# Patient Record
Sex: MALE | Race: ASIAN | NOT HISPANIC OR LATINO | Employment: OTHER | URBAN - METROPOLITAN AREA
[De-identification: names, ages, dates, MRNs, and addresses within clinical notes are randomized per-mention and may not be internally consistent; named-entity substitution may affect disease eponyms.]

---

## 2019-08-21 ENCOUNTER — APPOINTMENT (OUTPATIENT)
Dept: RADIOLOGY | Facility: MEDICAL CENTER | Age: 64
DRG: 228 | End: 2019-08-21
Payer: COMMERCIAL

## 2019-08-21 ENCOUNTER — APPOINTMENT (OUTPATIENT)
Dept: CARDIOLOGY | Facility: MEDICAL CENTER | Age: 64
DRG: 228 | End: 2019-08-21
Attending: INTERNAL MEDICINE
Payer: COMMERCIAL

## 2019-08-21 ENCOUNTER — APPOINTMENT (OUTPATIENT)
Dept: RADIOLOGY | Facility: MEDICAL CENTER | Age: 64
DRG: 228 | End: 2019-08-21
Attending: INTERNAL MEDICINE
Payer: COMMERCIAL

## 2019-08-21 ENCOUNTER — APPOINTMENT (OUTPATIENT)
Dept: CARDIOLOGY | Facility: MEDICAL CENTER | Age: 64
DRG: 228 | End: 2019-08-21
Payer: COMMERCIAL

## 2019-08-21 ENCOUNTER — HOSPITAL ENCOUNTER (INPATIENT)
Facility: MEDICAL CENTER | Age: 64
LOS: 2 days | DRG: 228 | End: 2019-08-23
Attending: INTERNAL MEDICINE | Admitting: INTERNAL MEDICINE
Payer: COMMERCIAL

## 2019-08-21 DIAGNOSIS — I21.3 ST ELEVATION MYOCARDIAL INFARCTION (STEMI), UNSPECIFIED ARTERY (HCC): ICD-10-CM

## 2019-08-21 PROBLEM — Z72.0 NICOTINE ABUSE: Status: ACTIVE | Noted: 2019-08-21

## 2019-08-21 PROBLEM — R73.9 HYPERGLYCEMIA: Status: ACTIVE | Noted: 2019-08-21

## 2019-08-21 PROBLEM — I50.21 ACUTE SYSTOLIC HEART FAILURE (HCC): Status: ACTIVE | Noted: 2019-08-21

## 2019-08-21 PROBLEM — I10 ESSENTIAL HYPERTENSION: Status: ACTIVE | Noted: 2019-08-21

## 2019-08-21 LAB
ALBUMIN SERPL BCP-MCNC: 3.9 G/DL (ref 3.2–4.9)
ALBUMIN/GLOB SERPL: 1.3 G/DL
ALP SERPL-CCNC: 69 U/L (ref 30–99)
ALT SERPL-CCNC: 14 U/L (ref 2–50)
AMPHET UR QL SCN: NEGATIVE
ANION GAP SERPL CALC-SCNC: 11 MMOL/L (ref 0–11.9)
APTT PPP: 29.9 SEC (ref 24.7–36)
AST SERPL-CCNC: 15 U/L (ref 12–45)
BARBITURATES UR QL SCN: NEGATIVE
BASOPHILS # BLD AUTO: 0.5 % (ref 0–1.8)
BASOPHILS # BLD: 0.04 K/UL (ref 0–0.12)
BENZODIAZ UR QL SCN: POSITIVE
BILIRUB SERPL-MCNC: 0.3 MG/DL (ref 0.1–1.5)
BUN SERPL-MCNC: 21 MG/DL (ref 8–22)
BZE UR QL SCN: NEGATIVE
CALCIUM SERPL-MCNC: 9.4 MG/DL (ref 8.5–10.5)
CANNABINOIDS UR QL SCN: NEGATIVE
CHLORIDE SERPL-SCNC: 105 MMOL/L (ref 96–112)
CO2 SERPL-SCNC: 23 MMOL/L (ref 20–33)
CREAT SERPL-MCNC: 0.98 MG/DL (ref 0.5–1.4)
EKG IMPRESSION: NORMAL
EOSINOPHIL # BLD AUTO: 0.18 K/UL (ref 0–0.51)
EOSINOPHIL NFR BLD: 2.4 % (ref 0–6.9)
ERYTHROCYTE [DISTWIDTH] IN BLOOD BY AUTOMATED COUNT: 47.7 FL (ref 35.9–50)
GLOBULIN SER CALC-MCNC: 2.9 G/DL (ref 1.9–3.5)
GLUCOSE SERPL-MCNC: 112 MG/DL (ref 65–99)
HCT VFR BLD AUTO: 43.3 % (ref 42–52)
HGB BLD-MCNC: 14.2 G/DL (ref 14–18)
IMM GRANULOCYTES # BLD AUTO: 0.02 K/UL (ref 0–0.11)
IMM GRANULOCYTES NFR BLD AUTO: 0.3 % (ref 0–0.9)
INR PPP: 0.95 (ref 0.87–1.13)
LIPASE SERPL-CCNC: 4 U/L (ref 11–82)
LYMPHOCYTES # BLD AUTO: 2.03 K/UL (ref 1–4.8)
LYMPHOCYTES NFR BLD: 26.7 % (ref 22–41)
MCH RBC QN AUTO: 31.7 PG (ref 27–33)
MCHC RBC AUTO-ENTMCNC: 32.8 G/DL (ref 33.7–35.3)
MCV RBC AUTO: 96.7 FL (ref 81.4–97.8)
METHADONE UR QL SCN: NEGATIVE
MONOCYTES # BLD AUTO: 0.76 K/UL (ref 0–0.85)
MONOCYTES NFR BLD AUTO: 10 % (ref 0–13.4)
NEUTROPHILS # BLD AUTO: 4.57 K/UL (ref 1.82–7.42)
NEUTROPHILS NFR BLD: 60.1 % (ref 44–72)
NRBC # BLD AUTO: 0 K/UL
NRBC BLD-RTO: 0 /100 WBC
NT-PROBNP SERPL IA-MCNC: 211 PG/ML (ref 0–125)
OPIATES UR QL SCN: POSITIVE
OXYCODONE UR QL SCN: NEGATIVE
PCP UR QL SCN: NEGATIVE
PLATELET # BLD AUTO: 202 K/UL (ref 164–446)
PMV BLD AUTO: 9.7 FL (ref 9–12.9)
POTASSIUM SERPL-SCNC: 4.1 MMOL/L (ref 3.6–5.5)
PROPOXYPH UR QL SCN: NEGATIVE
PROT SERPL-MCNC: 6.8 G/DL (ref 6–8.2)
PROTHROMBIN TIME: 12.9 SEC (ref 12–14.6)
RBC # BLD AUTO: 4.48 M/UL (ref 4.7–6.1)
SODIUM SERPL-SCNC: 139 MMOL/L (ref 135–145)
TROPONIN T SERPL-MCNC: 2449 NG/L (ref 6–19)
TROPONIN T SERPL-MCNC: 33 NG/L (ref 6–19)
TROPONIN T SERPL-MCNC: 3663 NG/L (ref 6–19)
WBC # BLD AUTO: 7.6 K/UL (ref 4.8–10.8)

## 2019-08-21 PROCEDURE — 700105 HCHG RX REV CODE 258

## 2019-08-21 PROCEDURE — 85025 COMPLETE CBC W/AUTO DIFF WBC: CPT

## 2019-08-21 PROCEDURE — A9270 NON-COVERED ITEM OR SERVICE: HCPCS | Performed by: INTERNAL MEDICINE

## 2019-08-21 PROCEDURE — 99291 CRITICAL CARE FIRST HOUR: CPT | Performed by: INTERNAL MEDICINE

## 2019-08-21 PROCEDURE — 700111 HCHG RX REV CODE 636 W/ 250 OVERRIDE (IP): Performed by: INTERNAL MEDICINE

## 2019-08-21 PROCEDURE — B2111ZZ FLUOROSCOPY OF MULTIPLE CORONARY ARTERIES USING LOW OSMOLAR CONTRAST: ICD-10-PCS | Performed by: INTERNAL MEDICINE

## 2019-08-21 PROCEDURE — 99223 1ST HOSP IP/OBS HIGH 75: CPT | Mod: 25 | Performed by: INTERNAL MEDICINE

## 2019-08-21 PROCEDURE — 93005 ELECTROCARDIOGRAM TRACING: CPT | Performed by: INTERNAL MEDICINE

## 2019-08-21 PROCEDURE — 770022 HCHG ROOM/CARE - ICU (200)

## 2019-08-21 PROCEDURE — A9270 NON-COVERED ITEM OR SERVICE: HCPCS

## 2019-08-21 PROCEDURE — 85610 PROTHROMBIN TIME: CPT

## 2019-08-21 PROCEDURE — 80053 COMPREHEN METABOLIC PANEL: CPT

## 2019-08-21 PROCEDURE — 85730 THROMBOPLASTIN TIME PARTIAL: CPT

## 2019-08-21 PROCEDURE — 700102 HCHG RX REV CODE 250 W/ 637 OVERRIDE(OP): Performed by: INTERNAL MEDICINE

## 2019-08-21 PROCEDURE — 027135Z DILATION OF CORONARY ARTERY, TWO ARTERIES WITH TWO DRUG-ELUTING INTRALUMINAL DEVICES, PERCUTANEOUS APPROACH: ICD-10-PCS | Performed by: INTERNAL MEDICINE

## 2019-08-21 PROCEDURE — 71045 X-RAY EXAM CHEST 1 VIEW: CPT

## 2019-08-21 PROCEDURE — 02C00ZZ EXTIRPATION OF MATTER FROM CORONARY ARTERY, ONE ARTERY, OPEN APPROACH: ICD-10-PCS | Performed by: INTERNAL MEDICINE

## 2019-08-21 PROCEDURE — A9270 NON-COVERED ITEM OR SERVICE: HCPCS | Performed by: HOSPITALIST

## 2019-08-21 PROCEDURE — 83690 ASSAY OF LIPASE: CPT

## 2019-08-21 PROCEDURE — 80307 DRUG TEST PRSMV CHEM ANLYZR: CPT

## 2019-08-21 PROCEDURE — 84484 ASSAY OF TROPONIN QUANT: CPT | Mod: 91

## 2019-08-21 PROCEDURE — 83880 ASSAY OF NATRIURETIC PEPTIDE: CPT

## 2019-08-21 PROCEDURE — 99407 BEHAV CHNG SMOKING > 10 MIN: CPT | Performed by: INTERNAL MEDICINE

## 2019-08-21 PROCEDURE — 99285 EMERGENCY DEPT VISIT HI MDM: CPT

## 2019-08-21 PROCEDURE — 93005 ELECTROCARDIOGRAM TRACING: CPT

## 2019-08-21 PROCEDURE — 99153 MOD SED SAME PHYS/QHP EA: CPT

## 2019-08-21 PROCEDURE — 99152 MOD SED SAME PHYS/QHP 5/>YRS: CPT | Performed by: INTERNAL MEDICINE

## 2019-08-21 PROCEDURE — 700105 HCHG RX REV CODE 258: Performed by: HOSPITALIST

## 2019-08-21 PROCEDURE — 700117 HCHG RX CONTRAST REV CODE 255: Performed by: INTERNAL MEDICINE

## 2019-08-21 PROCEDURE — 93458 L HRT ARTERY/VENTRICLE ANGIO: CPT | Mod: 26,59 | Performed by: INTERNAL MEDICINE

## 2019-08-21 PROCEDURE — 92941 PRQ TRLML REVSC TOT OCCL AMI: CPT | Mod: LD | Performed by: INTERNAL MEDICINE

## 2019-08-21 PROCEDURE — 4A023N7 MEASUREMENT OF CARDIAC SAMPLING AND PRESSURE, LEFT HEART, PERCUTANEOUS APPROACH: ICD-10-PCS | Performed by: INTERNAL MEDICINE

## 2019-08-21 PROCEDURE — 99291 CRITICAL CARE FIRST HOUR: CPT

## 2019-08-21 PROCEDURE — B2151ZZ FLUOROSCOPY OF LEFT HEART USING LOW OSMOLAR CONTRAST: ICD-10-PCS | Performed by: INTERNAL MEDICINE

## 2019-08-21 PROCEDURE — 700101 HCHG RX REV CODE 250

## 2019-08-21 PROCEDURE — 700102 HCHG RX REV CODE 250 W/ 637 OVERRIDE(OP)

## 2019-08-21 PROCEDURE — 700105 HCHG RX REV CODE 258: Performed by: INTERNAL MEDICINE

## 2019-08-21 PROCEDURE — 700102 HCHG RX REV CODE 250 W/ 637 OVERRIDE(OP): Performed by: HOSPITALIST

## 2019-08-21 PROCEDURE — 700111 HCHG RX REV CODE 636 W/ 250 OVERRIDE (IP)

## 2019-08-21 RX ORDER — LIDOCAINE HYDROCHLORIDE 20 MG/ML
INJECTION, SOLUTION INFILTRATION; PERINEURAL
Status: COMPLETED
Start: 2019-08-21 | End: 2019-08-21

## 2019-08-21 RX ORDER — ONDANSETRON 2 MG/ML
4 INJECTION INTRAMUSCULAR; INTRAVENOUS EVERY 4 HOURS PRN
Status: DISCONTINUED | OUTPATIENT
Start: 2019-08-21 | End: 2019-08-23 | Stop reason: HOSPADM

## 2019-08-21 RX ORDER — ASPIRIN 325 MG
325 TABLET ORAL DAILY
Status: DISCONTINUED | OUTPATIENT
Start: 2019-08-22 | End: 2019-08-21

## 2019-08-21 RX ORDER — ACETAMINOPHEN 325 MG/1
650 TABLET ORAL EVERY 6 HOURS PRN
Status: DISCONTINUED | OUTPATIENT
Start: 2019-08-21 | End: 2019-08-23 | Stop reason: HOSPADM

## 2019-08-21 RX ORDER — MIDAZOLAM HYDROCHLORIDE 1 MG/ML
INJECTION INTRAMUSCULAR; INTRAVENOUS
Status: COMPLETED
Start: 2019-08-21 | End: 2019-08-21

## 2019-08-21 RX ORDER — AMITRIPTYLINE HYDROCHLORIDE 10 MG/1
25 TABLET, FILM COATED ORAL NIGHTLY
Status: DISCONTINUED | OUTPATIENT
Start: 2019-08-21 | End: 2019-08-23 | Stop reason: HOSPADM

## 2019-08-21 RX ORDER — ALPRAZOLAM 0.5 MG/1
0.5 TABLET ORAL ONCE
Status: COMPLETED | OUTPATIENT
Start: 2019-08-21 | End: 2019-08-21

## 2019-08-21 RX ORDER — ASPIRIN 81 MG/1
324 TABLET, CHEWABLE ORAL DAILY
Status: DISCONTINUED | OUTPATIENT
Start: 2019-08-22 | End: 2019-08-21

## 2019-08-21 RX ORDER — MORPHINE SULFATE 4 MG/ML
2-4 INJECTION, SOLUTION INTRAMUSCULAR; INTRAVENOUS
Status: DISCONTINUED | OUTPATIENT
Start: 2019-08-21 | End: 2019-08-21

## 2019-08-21 RX ORDER — NITROGLYCERIN 0.4 MG/1
0.4 TABLET SUBLINGUAL
Status: DISCONTINUED | OUTPATIENT
Start: 2019-08-21 | End: 2019-08-23 | Stop reason: HOSPADM

## 2019-08-21 RX ORDER — GEMFIBROZIL 600 MG/1
600 TABLET, FILM COATED ORAL 2 TIMES DAILY
Status: DISCONTINUED | OUTPATIENT
Start: 2019-08-21 | End: 2019-08-21

## 2019-08-21 RX ORDER — LISINOPRIL 5 MG/1
5 TABLET ORAL EVERY 12 HOURS
Status: DISCONTINUED | OUTPATIENT
Start: 2019-08-21 | End: 2019-08-23 | Stop reason: HOSPADM

## 2019-08-21 RX ORDER — BUPRENORPHINE HYDROCHLORIDE AND NALOXONE HYDROCHLORIDE DIHYDRATE 8; 2 MG/1; MG/1
1 TABLET SUBLINGUAL 2 TIMES DAILY
Status: DISCONTINUED | OUTPATIENT
Start: 2019-08-21 | End: 2019-08-23 | Stop reason: HOSPADM

## 2019-08-21 RX ORDER — HEPARIN SODIUM,PORCINE 1000/ML
VIAL (ML) INJECTION
Status: COMPLETED
Start: 2019-08-21 | End: 2019-08-21

## 2019-08-21 RX ORDER — ASPIRIN 300 MG/1
300 SUPPOSITORY RECTAL DAILY
Status: DISCONTINUED | OUTPATIENT
Start: 2019-08-22 | End: 2019-08-21

## 2019-08-21 RX ORDER — VERAPAMIL HYDROCHLORIDE 2.5 MG/ML
INJECTION, SOLUTION INTRAVENOUS
Status: COMPLETED
Start: 2019-08-21 | End: 2019-08-21

## 2019-08-21 RX ORDER — PROCHLORPERAZINE EDISYLATE 5 MG/ML
5-10 INJECTION INTRAMUSCULAR; INTRAVENOUS EVERY 4 HOURS PRN
Status: DISCONTINUED | OUTPATIENT
Start: 2019-08-21 | End: 2019-08-23 | Stop reason: HOSPADM

## 2019-08-21 RX ORDER — ONDANSETRON 4 MG/1
4 TABLET, ORALLY DISINTEGRATING ORAL EVERY 4 HOURS PRN
Status: DISCONTINUED | OUTPATIENT
Start: 2019-08-21 | End: 2019-08-23 | Stop reason: HOSPADM

## 2019-08-21 RX ORDER — TRAMADOL HYDROCHLORIDE 50 MG/1
50 TABLET ORAL EVERY 8 HOURS PRN
Status: ON HOLD | COMMUNITY
End: 2019-08-23

## 2019-08-21 RX ORDER — AMOXICILLIN 250 MG
2 CAPSULE ORAL 2 TIMES DAILY
Status: DISCONTINUED | OUTPATIENT
Start: 2019-08-21 | End: 2019-08-23 | Stop reason: HOSPADM

## 2019-08-21 RX ORDER — POLYETHYLENE GLYCOL 3350 17 G/17G
1 POWDER, FOR SOLUTION ORAL
Status: DISCONTINUED | OUTPATIENT
Start: 2019-08-21 | End: 2019-08-23 | Stop reason: HOSPADM

## 2019-08-21 RX ORDER — LISINOPRIL 20 MG/1
40 TABLET ORAL DAILY
Status: DISCONTINUED | OUTPATIENT
Start: 2019-08-21 | End: 2019-08-21

## 2019-08-21 RX ORDER — PROMETHAZINE HYDROCHLORIDE 12.5 MG/1
12.5-25 SUPPOSITORY RECTAL EVERY 4 HOURS PRN
Status: DISCONTINUED | OUTPATIENT
Start: 2019-08-21 | End: 2019-08-23 | Stop reason: HOSPADM

## 2019-08-21 RX ORDER — ADENOSINE 3 MG/ML
INJECTION, SOLUTION INTRAVENOUS
Status: DISCONTINUED
Start: 2019-08-21 | End: 2019-08-21

## 2019-08-21 RX ORDER — BUPRENORPHINE HYDROCHLORIDE AND NALOXONE HYDROCHLORIDE DIHYDRATE 8; 2 MG/1; MG/1
1 TABLET SUBLINGUAL 2 TIMES DAILY
COMMUNITY
End: 2021-01-20

## 2019-08-21 RX ORDER — BISACODYL 10 MG
10 SUPPOSITORY, RECTAL RECTAL
Status: DISCONTINUED | OUTPATIENT
Start: 2019-08-21 | End: 2019-08-23 | Stop reason: HOSPADM

## 2019-08-21 RX ORDER — PROMETHAZINE HYDROCHLORIDE 25 MG/1
12.5-25 TABLET ORAL EVERY 4 HOURS PRN
Status: DISCONTINUED | OUTPATIENT
Start: 2019-08-21 | End: 2019-08-23 | Stop reason: HOSPADM

## 2019-08-21 RX ORDER — SODIUM CHLORIDE 9 MG/ML
INJECTION, SOLUTION INTRAVENOUS CONTINUOUS
Status: DISCONTINUED | OUTPATIENT
Start: 2019-08-21 | End: 2019-08-22

## 2019-08-21 RX ORDER — HYDROCODONE BITARTRATE AND ACETAMINOPHEN 5; 325 MG/1; MG/1
1 TABLET ORAL EVERY 4 HOURS PRN
Status: DISCONTINUED | OUTPATIENT
Start: 2019-08-21 | End: 2019-08-21

## 2019-08-21 RX ORDER — TRAMADOL HYDROCHLORIDE 50 MG/1
50 TABLET ORAL EVERY 8 HOURS PRN
Status: DISCONTINUED | OUTPATIENT
Start: 2019-08-21 | End: 2019-08-23 | Stop reason: HOSPADM

## 2019-08-21 RX ORDER — HEPARIN SODIUM 200 [USP'U]/100ML
INJECTION, SOLUTION INTRAVENOUS
Status: COMPLETED
Start: 2019-08-21 | End: 2019-08-21

## 2019-08-21 RX ORDER — MORPHINE SULFATE 15 MG/1
15 TABLET ORAL 2 TIMES DAILY PRN
Status: DISCONTINUED | OUTPATIENT
Start: 2019-08-21 | End: 2019-08-21

## 2019-08-21 RX ORDER — ATORVASTATIN CALCIUM 80 MG/1
80 TABLET, FILM COATED ORAL EVERY EVENING
Status: DISCONTINUED | OUTPATIENT
Start: 2019-08-21 | End: 2019-08-23 | Stop reason: HOSPADM

## 2019-08-21 RX ADMIN — TICAGRELOR 90 MG: 90 TABLET ORAL at 17:00

## 2019-08-21 RX ADMIN — LISINOPRIL 5 MG: 10 TABLET ORAL at 10:28

## 2019-08-21 RX ADMIN — LIDOCAINE HYDROCHLORIDE: 20 INJECTION, SOLUTION INFILTRATION; PERINEURAL at 07:29

## 2019-08-21 RX ADMIN — MIDAZOLAM HYDROCHLORIDE 2 MG: 1 INJECTION, SOLUTION INTRAMUSCULAR; INTRAVENOUS at 07:30

## 2019-08-21 RX ADMIN — ATORVASTATIN CALCIUM 80 MG: 80 TABLET, FILM COATED ORAL at 17:00

## 2019-08-21 RX ADMIN — BUPRENORPHINE HYDROCHLORIDE AND NALOXONE HYDROCHLORIDE DIHYDRATE 1 TABLET: 8; 2 TABLET SUBLINGUAL at 17:00

## 2019-08-21 RX ADMIN — ALPRAZOLAM 0.5 MG: 0.5 TABLET ORAL at 15:25

## 2019-08-21 RX ADMIN — FENTANYL CITRATE 100 MCG: 50 INJECTION, SOLUTION INTRAMUSCULAR; INTRAVENOUS at 07:30

## 2019-08-21 RX ADMIN — VERAPAMIL HYDROCHLORIDE 2.5 MG: 2.5 INJECTION, SOLUTION INTRAVENOUS at 07:00

## 2019-08-21 RX ADMIN — SODIUM CHLORIDE: 9 INJECTION, SOLUTION INTRAVENOUS at 11:10

## 2019-08-21 RX ADMIN — AMITRIPTYLINE HYDROCHLORIDE 25 MG: 10 TABLET, FILM COATED ORAL at 20:08

## 2019-08-21 RX ADMIN — SENNOSIDES, DOCUSATE SODIUM 2 TABLET: 50; 8.6 TABLET, FILM COATED ORAL at 17:00

## 2019-08-21 RX ADMIN — HEPARIN SODIUM: 1000 INJECTION, SOLUTION INTRAVENOUS; SUBCUTANEOUS at 07:34

## 2019-08-21 RX ADMIN — TRAMADOL HYDROCHLORIDE 50 MG: 50 TABLET, FILM COATED ORAL at 13:28

## 2019-08-21 RX ADMIN — TICAGRELOR 180 MG: 90 TABLET ORAL at 08:05

## 2019-08-21 RX ADMIN — IOHEXOL 191 ML: 350 INJECTION, SOLUTION INTRAVENOUS at 08:00

## 2019-08-21 RX ADMIN — FENTANYL CITRATE 100 MCG: 50 INJECTION, SOLUTION INTRAMUSCULAR; INTRAVENOUS at 07:33

## 2019-08-21 RX ADMIN — MIDAZOLAM HYDROCHLORIDE 2 MG: 1 INJECTION, SOLUTION INTRAMUSCULAR; INTRAVENOUS at 07:33

## 2019-08-21 RX ADMIN — LISINOPRIL 5 MG: 10 TABLET ORAL at 17:00

## 2019-08-21 RX ADMIN — BIVALIRUDIN 1.75 MG/KG/HR: 250 INJECTION, POWDER, LYOPHILIZED, FOR SOLUTION INTRAVENOUS at 07:17

## 2019-08-21 RX ADMIN — NITROGLYCERIN 10 ML: 20 INJECTION INTRAVENOUS at 07:29

## 2019-08-21 RX ADMIN — HEPARIN SODIUM 2000 UNITS: 200 INJECTION, SOLUTION INTRAVENOUS at 07:30

## 2019-08-21 RX ADMIN — BUPRENORPHINE HYDROCHLORIDE AND NALOXONE HYDROCHLORIDE DIHYDRATE 1 TABLET: 8; 2 TABLET SUBLINGUAL at 11:09

## 2019-08-21 SDOH — HEALTH STABILITY: MENTAL HEALTH: HOW OFTEN DO YOU HAVE A DRINK CONTAINING ALCOHOL?: NEVER

## 2019-08-21 ASSESSMENT — ENCOUNTER SYMPTOMS
DEPRESSION: 0
NERVOUS/ANXIOUS: 0
NECK PAIN: 1
DIARRHEA: 0
SPEECH CHANGE: 0
HEARTBURN: 0
EYE DISCHARGE: 0
DIAPHORESIS: 0
CHILLS: 0
EYE REDNESS: 0
TINGLING: 0
HEADACHES: 0
FOCAL WEAKNESS: 0
MYALGIAS: 0
ABDOMINAL PAIN: 0
VOMITING: 0
STRIDOR: 0
SINUS PAIN: 0
DIZZINESS: 0
DOUBLE VISION: 0
EYE PAIN: 0
POLYDIPSIA: 0
INSOMNIA: 0
SPUTUM PRODUCTION: 0
BACK PAIN: 0
WEIGHT LOSS: 0
SEIZURES: 0
BRUISES/BLEEDS EASILY: 0
FEVER: 0
NAUSEA: 0
BLOOD IN STOOL: 0
WHEEZING: 0
SHORTNESS OF BREATH: 0
PALPITATIONS: 0
ORTHOPNEA: 0
BLURRED VISION: 0
HEMOPTYSIS: 0
NECK PAIN: 0
SENSORY CHANGE: 0
PHOTOPHOBIA: 0
COUGH: 0

## 2019-08-21 ASSESSMENT — COGNITIVE AND FUNCTIONAL STATUS - GENERAL
SUGGESTED CMS G CODE MODIFIER MOBILITY: CH
SUGGESTED CMS G CODE MODIFIER DAILY ACTIVITY: CH
DAILY ACTIVITIY SCORE: 24
MOBILITY SCORE: 24

## 2019-08-21 ASSESSMENT — LIFESTYLE VARIABLES
ON A TYPICAL DAY WHEN YOU DRINK ALCOHOL HOW MANY DRINKS DO YOU HAVE: 0
TOTAL SCORE: 0
ALCOHOL_USE: NO
HAVE YOU EVER FELT YOU SHOULD CUT DOWN ON YOUR DRINKING: NO
EVER FELT BAD OR GUILTY ABOUT YOUR DRINKING: NO
EVER_SMOKED: YES
HAVE PEOPLE ANNOYED YOU BY CRITICIZING YOUR DRINKING: NO
CONSUMPTION TOTAL: NEGATIVE
HOW MANY TIMES IN THE PAST YEAR HAVE YOU HAD 5 OR MORE DRINKS IN A DAY: 0
TOTAL SCORE: 0
EVER HAD A DRINK FIRST THING IN THE MORNING TO STEADY YOUR NERVES TO GET RID OF A HANGOVER: NO
AVERAGE NUMBER OF DAYS PER WEEK YOU HAVE A DRINK CONTAINING ALCOHOL: 0
TOTAL SCORE: 0

## 2019-08-21 ASSESSMENT — PATIENT HEALTH QUESTIONNAIRE - PHQ9
4. FEELING TIRED OR HAVING LITTLE ENERGY: SEVERAL DAYS
2. FEELING DOWN, DEPRESSED, IRRITABLE, OR HOPELESS: NOT AT ALL
7. TROUBLE CONCENTRATING ON THINGS, SUCH AS READING THE NEWSPAPER OR WATCHING TELEVISION: NOT AT ALL
3. TROUBLE FALLING OR STAYING ASLEEP OR SLEEPING TOO MUCH: NOT AT ALL
SUM OF ALL RESPONSES TO PHQ9 QUESTIONS 1 AND 2: 1
8. MOVING OR SPEAKING SO SLOWLY THAT OTHER PEOPLE COULD HAVE NOTICED. OR THE OPPOSITE, BEING SO FIGETY OR RESTLESS THAT YOU HAVE BEEN MOVING AROUND A LOT MORE THAN USUAL: NOT AT ALL
SUM OF ALL RESPONSES TO PHQ QUESTIONS 1-9: 2
1. LITTLE INTEREST OR PLEASURE IN DOING THINGS: SEVERAL DAYS
5. POOR APPETITE OR OVEREATING: NOT AT ALL
6. FEELING BAD ABOUT YOURSELF - OR THAT YOU ARE A FAILURE OR HAVE LET YOURSELF OR YOUR FAMILY DOWN: NOT AL ALL
9. THOUGHTS THAT YOU WOULD BE BETTER OFF DEAD, OR OF HURTING YOURSELF: NOT AT ALL

## 2019-08-21 NOTE — ASSESSMENT & PLAN NOTE
Heart cath 30% ef  Pending echo  On lisinopril, metoprolol and statin  Prior metoprolol use  Per patient no hx of heart failure  Drug screen, he denies drug use  Tobacco use contributory

## 2019-08-21 NOTE — PROGRESS NOTES
Tech from Lab called with critical result of troponin 2449 at 1530. Critical lab result read back to tech.   This critical lab result is within parameters established by Dr. Carlisle for this patient.

## 2019-08-21 NOTE — ASSESSMENT & PLAN NOTE
Status post PCI to LAD  EF 30% on angiogram  Continue aspirin Brilinta and high-dose Lipitor  Metoprolol as tolerated  Patient started on lisinopril and Aldactone  Follow-up on echocardiogram results  Telemetry monitoring    Reinforced importance of smoking cessation and compliance with medical therapy after discharge

## 2019-08-21 NOTE — PROGRESS NOTES
"Patient complaining of chest pressure and feeling like he \"can't get enough air\". Complains of middle back pain, previously medicated with tramadol. STAT EKG ordered.  "

## 2019-08-21 NOTE — PROGRESS NOTES
Release of information signed by patient and faxed to [facility] to obtain [facility]. Fax confirmation placed in chart.

## 2019-08-21 NOTE — ED PROVIDER NOTES
"ED Provider Note  CHIEF COMPLAINT  Chest pain--STEMI    HPI  Tan Moore is a 64 y.o. male who presents to the ER with complaint of chest pain which began at 5:00 this morning while he was just waking up.  He states the pain is located in his anterior chest.  It radiates to the undersurface of his left upper extremity.  He had profuse diaphoresis at onset of pain.  No dizziness or lightheadedness.  He feels mildly short of breath.  No nausea or vomiting.  No history of similar pain in the past.  He is a tobacco user.  No hypertension or diabetes.  His mother  \"when she was young\" and he does not know his father.  He is unsure what his mother  of.  He was given nitroglycerin in route with some improvement in his pain.  He was given 4 aspirins in route.  EKG on scene revealed a STEMI.  A pre-alert STEMI was called prior to patient arrival.  Cardiologist at bedside upon patient arrival.    REVIEW OF SYSTEMS  See HPI for further details.  Positive for chest pain, diaphoresis, shortness of breath, chronic low back pain.  Negative for lightheadedness, dizziness, syncope, abdominal pain, vomiting, cough, pain or swelling in the lower extremities.  All other systems are negative.    PAST MEDICAL HISTORY  Past Medical History:   Diagnosis Date   • Anxiety    • Arthritis    • Bilateral knee pain     CHRONIC   • Chronic low back pain    • Chronic neck pain    • Depression    • Hypertension        FAMILY HISTORY  No family history on file.    SOCIAL HISTORY  Social History     Socioeconomic History   • Marital status: Single     Spouse name: Not on file   • Number of children: Not on file   • Years of education: Not on file   • Highest education level: Not on file   Occupational History   • Not on file   Social Needs   • Financial resource strain: Not on file   • Food insecurity:     Worry: Not on file     Inability: Not on file   • Transportation needs:     Medical: Not on file     Non-medical: Not on file " "  Tobacco Use   • Smoking status: Current Every Day Smoker     Packs/day: 1.00     Years: 46.00     Pack years: 46.00     Types: Cigarettes   • Smokeless tobacco: Never Used   Substance and Sexual Activity   • Alcohol use: Never     Frequency: Never   • Drug use: Not on file   • Sexual activity: Not on file   Lifestyle   • Physical activity:     Days per week: Not on file     Minutes per session: Not on file   • Stress: Not on file   Relationships   • Social connections:     Talks on phone: Not on file     Gets together: Not on file     Attends Yarsani service: Not on file     Active member of club or organization: Not on file     Attends meetings of clubs or organizations: Not on file     Relationship status: Not on file   • Intimate partner violence:     Fear of current or ex partner: Not on file     Emotionally abused: Not on file     Physically abused: Not on file     Forced sexual activity: Not on file   Other Topics Concern   • Not on file   Social History Narrative   • Not on file       SURGICAL HISTORY  Past Surgical History:   Procedure Laterality Date   • LAMINOTOMY  1988    L4-L5   • ATHROPLASTY      b/l knee surgery       CURRENT MEDICATIONS  Home Medications     Reviewed by Arvind Trinidad (Pharmacy Tech) on 08/21/19 at 0812  Med List Status: Complete   Medication Last Dose Status   amitriptyline (ELAVIL) 50 MG Tab UNK Active   buprenorphine-naloxone (SUBOXONE) 8-2 MG SL Tab UNK Active   metoprolol (LOPRESSOR) 25 MG Tab UNK Active   tramadol (ULTRAM) 50 MG Tab UNK Active                ALLERGIES  No Known Allergies    PHYSICAL EXAM  VITAL SIGNS: /84   Pulse (!) 54   Temp 36.1 °C (97 °F) (Temporal)   Resp (!) 22   Ht 1.753 m (5' 9\")   Wt 72.6 kg (160 lb)   SpO2 99%   BMI 23.63 kg/m²    Constitutional: Well developed, well nourished; No acute distress; Non-toxic appearance.   HENT: Normocephalic, atraumatic; Bilateral external ears normal; Oropharynx with moist mucous membranes; No " erythema or exudates in the posterior oropharynx.   Eyes: PERRL, EOMI, Conjunctiva normal. No discharge.   Neck:  Supple, nontender midline; No stridor; No nuchal rigidity.   Lymphatic: No cervical lymphadenopathy noted.   Cardiovascular: Regular rate and rhythm without murmurs, rubs, or gallop.   Thorax & Lungs: No respiratory distress, breath sounds clear to auscultation bilaterally without wheezing, rales or rhonchi. Nontender chest wall. No crepitus or subcutaneous air  Abdomen: Soft, nontender, bowel sounds normal. No obvious masses; No pulsatile masses; no rebound, guarding, or peritoneal signs.   Skin: Good color; warm and dry without rash or petechia.  Back: Nontender, No CVA tenderness.   Extremities: Distal radial, dorsalis pedis, posterior tibial pulses are equal bilaterally; No edema; Nontender calves or saphenous, No cyanosis, No clubbing.   Musculoskeletal: Good range of motion in all major joints. No tenderness to palpation or major deformities noted.   Neurologic: Alert & oriented x 4, clear speech    EKG  EKG done immediately upon arrival: Rate of 68.  Normal sinus rhythm.  Left axis deviation.  There is ST depression in lead II, 3, aVF, V4 through V6 with ST elevation in aVR, aVL, V1 through V3 consistent with a STEMI.    RADIOLOGY/PROCEDURES  Results for orders placed or performed during the hospital encounter of 08/21/19   TROPONIN   Result Value Ref Range    Troponin T 33 (H) 6 - 19 ng/L   proBrain Natriuretic Peptide, NT   Result Value Ref Range    NT-proBNP 211 (H) 0 - 125 pg/mL   CBC WITH DIFFERENTIAL   Result Value Ref Range    WBC 7.6 4.8 - 10.8 K/uL    RBC 4.48 (L) 4.70 - 6.10 M/uL    Hemoglobin 14.2 14.0 - 18.0 g/dL    Hematocrit 43.3 42.0 - 52.0 %    MCV 96.7 81.4 - 97.8 fL    MCH 31.7 27.0 - 33.0 pg    MCHC 32.8 (L) 33.7 - 35.3 g/dL    RDW 47.7 35.9 - 50.0 fL    Platelet Count 202 164 - 446 K/uL    MPV 9.7 9.0 - 12.9 fL    Neutrophils-Polys 60.10 44.00 - 72.00 %    Lymphocytes 26.70  22.00 - 41.00 %    Monocytes 10.00 0.00 - 13.40 %    Eosinophils 2.40 0.00 - 6.90 %    Basophils 0.50 0.00 - 1.80 %    Immature Granulocytes 0.30 0.00 - 0.90 %    Nucleated RBC 0.00 /100 WBC    Neutrophils (Absolute) 4.57 1.82 - 7.42 K/uL    Lymphs (Absolute) 2.03 1.00 - 4.80 K/uL    Monos (Absolute) 0.76 0.00 - 0.85 K/uL    Eos (Absolute) 0.18 0.00 - 0.51 K/uL    Baso (Absolute) 0.04 0.00 - 0.12 K/uL    Immature Granulocytes (abs) 0.02 0.00 - 0.11 K/uL    NRBC (Absolute) 0.00 K/uL   COMP METABOLIC PANEL   Result Value Ref Range    Sodium 139 135 - 145 mmol/L    Potassium 4.1 3.6 - 5.5 mmol/L    Chloride 105 96 - 112 mmol/L    Co2 23 20 - 33 mmol/L    Anion Gap 11.0 0.0 - 11.9    Glucose 112 (H) 65 - 99 mg/dL    Bun 21 8 - 22 mg/dL    Creatinine 0.98 0.50 - 1.40 mg/dL    Calcium 9.4 8.5 - 10.5 mg/dL    AST(SGOT) 15 12 - 45 U/L    ALT(SGPT) 14 2 - 50 U/L    Alkaline Phosphatase 69 30 - 99 U/L    Total Bilirubin 0.3 0.1 - 1.5 mg/dL    Albumin 3.9 3.2 - 4.9 g/dL    Total Protein 6.8 6.0 - 8.2 g/dL    Globulin 2.9 1.9 - 3.5 g/dL    A-G Ratio 1.3 g/dL   LIPASE   Result Value Ref Range    Lipase 4 (L) 11 - 82 U/L   PROTHROMBIN TIME   Result Value Ref Range    PT 12.9 12.0 - 14.6 sec    INR 0.95 0.87 - 1.13   APTT   Result Value Ref Range    APTT 29.9 24.7 - 36.0 sec   ESTIMATED GFR   Result Value Ref Range    GFR If African American >60 >60 mL/min/1.73 m 2    GFR If Non African American >60 >60 mL/min/1.73 m 2   URINE DRUG SCREEN   Result Value Ref Range    Amphetamines Urine Negative Negative    Barbiturates Negative Negative    Benzodiazepines Positive (A) Negative    Cocaine Metabolite Negative Negative    Methadone Negative Negative    Opiates Positive (A) Negative    Oxycodone Negative Negative    Phencyclidine -Pcp Negative Negative    Propoxyphene Negative Negative    Cannabinoid Metab Negative Negative   TROPONIN   Result Value Ref Range    Troponin T 2449 (H) 6 - 19 ng/L   EKG   Result Value Ref Range    Report        [facility] Emergency Dept.    Test Date:  2019  Pt Name:    MARIBEL SCHOFIELD              Department: ER  MRN:        [id number]                      Room:       T605  Gender:     Male                         Technician: [id number]  :        1955                   Requested By:ER TRIAGE PROTOCOL  Order #:    [id number]                    Reading MD: Radha Chandra    Measurements  Intervals                                Axis  Rate:       68                           P:          78  TN:         182                          QRS:        -54  QRSD:       106                          T:          9  QT:         418  QTc:        445    Interpretive Statements  Sinus rhythm rate 68  LAD, consider left anterior fascicular block  ST elevation AVL, V2, V3, and AVR  ST depression II, III,  AVF, V4-V6  STEMI  Probable anteroseptal infarct, acute  Abnormal T, consider ischemia, inferior leads  No previous ECG available for comparison    Electronically Signed On 2019 16:30: 28 PDT by Radha Chandra     EKG - STAT Once   Result Value Ref Range    Report       [facility]    Test Date:  2019  Pt Name:    MARIBEL SCHOFIELD              Department: ER  MRN:        [id number]                      Room:       05  Gender:     Male                         Technician: DLH  :        1955                   Requested By:JOSE MARIA ARMIJO  Order #:    [id number]                    Reading MD:    Measurements  Intervals                                Axis  Rate:       53                           P:          58  TN:         194                          QRS:        15  QRSD:       106                          T:          75  QT:         427  QTc:        401    Interpretive Statements  SINUS BRADYCARDIA  ANTEROSEPTAL INFARCT, OLD  Compared to ECG 2019 06:40:41  Sinus rhythm no longer present  T-wave abnormality no longer present  Possible ischemia no longer present  Myocardial infarct finding still  present     EKG in four (4) hours   Result Value Ref Range    Report       [facility]    Test Date:  2019  Pt Name:    MARIBEL SCHOFIELD              Department: ER  MRN:        [id number]                      Room:       T605  Gender:     Male                         Technician: GLORIA  :        1955                   Requested By:JOSE MARIA ARMIJO  Order #:    [id number]                    Reading MD:    Measurements  Intervals                                Axis  Rate:       56                           P:          69  ME:         185                          QRS:        56  QRSD:       100                          T:          70  QT:         431  QTc:        416    Interpretive Statements  SINUS BRADYCARDIA  MINIMAL ST DEPRESSION, INFERIOR LEADS  Compared to ECG 2019 08:25:22  ST (T wave) deviation now present  Myocardial infarct finding no longer present         COURSE & MEDICAL DECISION MAKING  Pertinent Labs & Imaging studies reviewed. (See chart for details)    Results for orders placed or performed during the hospital encounter of 19   TROPONIN   Result Value Ref Range    Troponin T 33 (H) 6 - 19 ng/L   proBrain Natriuretic Peptide, NT   Result Value Ref Range    NT-proBNP 211 (H) 0 - 125 pg/mL   CBC WITH DIFFERENTIAL   Result Value Ref Range    WBC 7.6 4.8 - 10.8 K/uL    RBC 4.48 (L) 4.70 - 6.10 M/uL    Hemoglobin 14.2 14.0 - 18.0 g/dL    Hematocrit 43.3 42.0 - 52.0 %    MCV 96.7 81.4 - 97.8 fL    MCH 31.7 27.0 - 33.0 pg    MCHC 32.8 (L) 33.7 - 35.3 g/dL    RDW 47.7 35.9 - 50.0 fL    Platelet Count 202 164 - 446 K/uL    MPV 9.7 9.0 - 12.9 fL    Neutrophils-Polys 60.10 44.00 - 72.00 %    Lymphocytes 26.70 22.00 - 41.00 %    Monocytes 10.00 0.00 - 13.40 %    Eosinophils 2.40 0.00 - 6.90 %    Basophils 0.50 0.00 - 1.80 %    Immature Granulocytes 0.30 0.00 - 0.90 %    Nucleated RBC 0.00 /100 WBC    Neutrophils (Absolute) 4.57 1.82 - 7.42 K/uL    Lymphs (Absolute) 2.03 1.00 - 4.80 K/uL     Monos (Absolute) 0.76 0.00 - 0.85 K/uL    Eos (Absolute) 0.18 0.00 - 0.51 K/uL    Baso (Absolute) 0.04 0.00 - 0.12 K/uL    Immature Granulocytes (abs) 0.02 0.00 - 0.11 K/uL    NRBC (Absolute) 0.00 K/uL   COMP METABOLIC PANEL   Result Value Ref Range    Sodium 139 135 - 145 mmol/L    Potassium 4.1 3.6 - 5.5 mmol/L    Chloride 105 96 - 112 mmol/L    Co2 23 20 - 33 mmol/L    Anion Gap 11.0 0.0 - 11.9    Glucose 112 (H) 65 - 99 mg/dL    Bun 21 8 - 22 mg/dL    Creatinine 0.98 0.50 - 1.40 mg/dL    Calcium 9.4 8.5 - 10.5 mg/dL    AST(SGOT) 15 12 - 45 U/L    ALT(SGPT) 14 2 - 50 U/L    Alkaline Phosphatase 69 30 - 99 U/L    Total Bilirubin 0.3 0.1 - 1.5 mg/dL    Albumin 3.9 3.2 - 4.9 g/dL    Total Protein 6.8 6.0 - 8.2 g/dL    Globulin 2.9 1.9 - 3.5 g/dL    A-G Ratio 1.3 g/dL   LIPASE   Result Value Ref Range    Lipase 4 (L) 11 - 82 U/L   PROTHROMBIN TIME   Result Value Ref Range    PT 12.9 12.0 - 14.6 sec    INR 0.95 0.87 - 1.13   APTT   Result Value Ref Range    APTT 29.9 24.7 - 36.0 sec   ESTIMATED GFR   Result Value Ref Range    GFR If African American >60 >60 mL/min/1.73 m 2    GFR If Non African American >60 >60 mL/min/1.73 m 2   URINE DRUG SCREEN   Result Value Ref Range    Amphetamines Urine Negative Negative    Barbiturates Negative Negative    Benzodiazepines Positive (A) Negative    Cocaine Metabolite Negative Negative    Methadone Negative Negative    Opiates Positive (A) Negative    Oxycodone Negative Negative    Phencyclidine -Pcp Negative Negative    Propoxyphene Negative Negative    Cannabinoid Metab Negative Negative   TROPONIN   Result Value Ref Range    Troponin T 2449 (H) 6 - 19 ng/L   EKG   Result Value Ref Range    Report       [facility] Emergency Dept.    Test Date:  2019  Pt Name:    MARIBEL SCHOFIELD              Department: ER  MRN:        [id number]                      Room:       [facility]  Gender:     Male                         Technician: [id number]  :        1955                    Requested By:ER TRIAGE PROTOCOL  Order #:    [id number]                    Reading MD: Radha Chandra    Measurements  Intervals                                Axis  Rate:       68                           P:          78  NE:         182                          QRS:        -54  QRSD:       106                          T:          9  QT:         418  QTc:        445    Interpretive Statements  Sinus rhythm rate 68  LAD, consider left anterior fascicular block  ST elevation AVL, V2, V3, and AVR  ST depression II, III,  AVF, V4-V6  STEMI  Probable anteroseptal infarct, acute  Abnormal T, consider ischemia, inferior leads  No previous ECG available for comparison    Electronically Signed On 2019 16:30: 28 PDT by Radha Chandra     EKG - STAT Once   Result Value Ref Range    Report       [facility]    Test Date:  2019  Pt Name:    MARIBEL SCHOFIELD              Department: ER  MRN:        [id number]                      Room:       [facility]  Gender:     Male                         Technician: GLORIA  :        1955                   Requested By:JOSE MARIA ARMIJO  Order #:    [id number]                    Reading MD:    Measurements  Intervals                                Axis  Rate:       53                           P:          58  NE:         194                          QRS:        15  QRSD:       106                          T:          75  QT:         427  QTc:        401    Interpretive Statements  SINUS BRADYCARDIA  ANTEROSEPTAL INFARCT, OLD  Compared to ECG 2019 06:40:41  Sinus rhythm no longer present  T-wave abnormality no longer present  Possible ischemia no longer present  Myocardial infarct finding still present     EKG in four (4) hours   Result Value Ref Range    Report       [facility]    Test Date:  2019  Pt Name:    MARIBEL SCHOFIELD              Department: ER  MRN:        [id number]                      Room:       [facility]  Gender:     Male                         Technician:  [facility]  :        1955                   Requested By:JOSE MARIA ARMIJO  Order #:    [id number]                    Reading MD:    Measurements  Intervals                                Axis  Rate:       56                           P:          69  MO:         185                          QRS:        56  QRSD:       100                          T:          70  QT:         431  QTc:        416    Interpretive Statements  SINUS BRADYCARDIA  MINIMAL ST DEPRESSION, INFERIOR LEADS  Compared to ECG 2019 08:25:22  ST (T wave) deviation now present  Myocardial infarct finding no longer present       CL-LEFT HEART CATHETERIZATION WITH POSSIBLE INTERVENTION    (Results Pending)   EC-ECHOCARDIOGRAM COMPLETE W/O CONT    (Results Pending)   DX-CHEST-PORTABLE (1 VIEW)    (Results Pending)       0750:  Discussed with Dr. Armijo, hospitalist, who will admit pt to his service. Aware that pt is currently in Cath Lab.    Patient presents with chest pain as a pre-alert STEMI.  Patient reports that at 5:00 this morning as he was waking up and getting out of bed he developed a substernal chest pain which radiated into his left upper extremity.  He was short of breath and extremely diaphoretic.  He said he was dripping in sweat.  No lightheadedness or dizziness.  No nausea or vomiting.  When EMS arrived they found a STEMI on his EKG.  He was given aspirin and nitroglycerin and was transported here to the emergency department.  Upon arrival to [facility], Dr. Carlisle (interventional cardiologist) this is was at bedside evaluating the patient and the Cath Lab had been activated.  Patient was given some morphine and some more nitroglycerin here in the emergency department.  He remained hemodynamically stable.  He still had quite a bit of chest discomfort.  Repeat EKG once again revealed what looks like an anterior STEMI although I am highly suspicious of a high-grade lesion given the ST elevation in aVR as well as the diffuse ischemic  changes throughout.  Nonetheless, he will be going to the cardiac catheterization lab with the interventional cardiologist.  Patient was admitted to the hospital service under the care of Dr. Espinoza who is aware that patient is in cardiac Cath Lab at this time and he will see patient he is done with his procedure.    CRITICAL CARE  The very real possibilty of a deterioration of this patient's condition required the highest level of my preparedness for sudden, emergent intervention.  I provided critical care services, which included medication orders, frequent reevaluations of the patient's condition and response to treatment, ordering and reviewing test results, and discussing the case with various consultants.  The critical care time associated with the care of the patient was 35 minutes. Review chart for interventions. This time is exclusive of any other billable procedures.       FINAL IMPRESSION  1. ST elevation myocardial infarction (STEMI), unspecified artery (HCC)  REFERRAL TO INTENSIVE CARDIAC REHAB/CARDIAC REHAB    The critical care time associated with the care of the patient was 35 minutes. Review chart for interventions. This time is exclusive of any other billable procedures.      This dictation has been created using voice recognition software. The accuracy of the dictation is limited by the abilities of the software. I expect there may be some errors of grammar and possibly content. I made every attempt to manually correct the errors within my dictation. However, errors related to voice recognition software may still exist and should be interpreted within the appropriate context.    Electronically signed by: Radha Chandra, 8/21/2019 7:19 AM

## 2019-08-21 NOTE — PROCEDURES
DATE OF SERVICE:  08/21/2019    REFERRING PHYSICIAN: Radha Lemons.    PROCEDURES:  1.  Left heart catheterization.  2.  Coronary angiography.  3.  Thrombectomy/percutaneous transluminal coronary angioplasty/stent   placement of the proximal left anterior descending artery.  4.  Percutaneous transluminal coronary angioplasty/stent placement of the mid   left anterior descending artery.  5.  Left ventriculogram.  6.  Monitored conscious sedation.    PREPROCEDURE DIAGNOSES:  1.  Chest pain.  2.  Acute anterior myocardial infarction/ ST-elevation myocardial infarction.    POSTPROCEDURE DIAGNOSES:  1.  Coronary artery disease with occluded proximal and mid left anterior   descending artery.  2.  Successful thrombectomy/percutaneous transluminal coronary angioplasty,  stent placement of the proximal left anterior descending artery with 2.5x20 mm   Synergy drug-eluting stent.  3.  Successful percutaneous transluminal coronary angioplasty,stent placement   of the mid left anterior descending artery with 2.25x28 mm Synergy   drug-eluting stent.  4.  Reduced left ventricular systolic function with ejection fraction of 30%.  5.  Elevated left ventricular end-diastolic pressure.    INDICATION:  The patient is a 64-year-old male with past medical history   significant for hypertension and chronic tobacco abuse.  The patient was   brought to [facility] Emergency Room with chest pain and was   diagnosed with acute anterior myocardial infarction.  STEMI was activated.    The patient was brought to the cardiac catheterization laboratory.    DESCRIPTION OF PROCEDURE:  The patient was brought to the cardiac   catheterization laboratory.  He was prepped and draped in the usual sterile   manner.  The right wrist area was anesthetized with 2% Xylocaine.  A 6-Occitan   sheath was inserted into the right radial artery using the modified Seldinger   technique.  Intra-arterial verapamil and nitroglycerin were given.  IV  heparin   was given.  A 4-Prydeinig pigtail catheter was positioned into the left   ventricle.  Left ventriculography was performed.  This was exchanged for a   4-Prydeinig JR4 catheter, which was positioned into the right coronary artery.    Coronary angiography was performed.  This was exchanged for EBU 3.5 guide   catheter, which was positioned into the left main coronary artery.  Coronary   angiography was performed.  IV Angiomax was started.  A Prowater wire was used   to cross identified occlusions.  Thrombectomy of the proximal occlusion was   performed.  A 2.0x20 mm Emerge balloon was used to predilate occluded the mid   stenosis.  A 2.25x28 mm Synergy drug-eluting stent was successfully positioned   and deployed in the mid portion.  The proximal lesion was predilated with   2.5x15 mm Emerge balloon.  A 2.5x20 mm Synergy drug-eluting stent was   successfully positioned and deployed.  This stent was postdilated with 2.5x50   mm NC Emerge balloon.  The patient tolerated the procedure well.  At the end   of procedure, all wires, balloons, guide, and sheaths were removed.  Hemoband   was placed in the right wrist.  He was given oral Brilinta and transferred to   [facility] in stable condition.    HEMODYNAMIC DATA:  Hemodynamic data shows aortic pressures of 120/70 with mean   of 90 mmHg and /0 with LVEDP of 30 mmHg.    AORTIC VALVE:  There was no significant gradient noted.    LEFT VENTRICULOGRAM:  A 10 mL of contrast was delivered for 3 seconds.    Ejection fraction was estimated to be 30%.  There was large anterior apical   hypokinesis noted.    ANGIOGRAM:  LEFT MAIN CORONARY ARTERY:  Left main coronary artery is a long large caliber   vessel free of stenosis.    LEFT ANTERIOR DESCENDING ARTERY:  Left anterior descending artery is a long   moderate caliber vessel, which is diffusely diseased with mild-to-moderate   calcification.  There is an occlusion of the proximal portion associated with   thrombus.  There is a  second occlusion of the mid portion.  There is a small   caliber bifurcating diagonal branch with ostial proximal 80% stenosis.  There   is a moderate second diagonal branch noted with significant stenosis.    RAMUS INTERMEDIUS:  Ramus intermedius is a long, small-to-moderate caliber   vessel with proximal concentric 60% stenosis.    LEFT CIRCUMFLEX ARTERY:  Left circumflex artery is a nondominant   moderate-caliber vessel with luminal irregularities of 10-20%.  Distally,   there is a moderate bifurcating obtuse marginal branch noted free of disease.    RIGHT CORONARY ARTERY:  Right coronary artery is a dominant moderate caliber   vessel with luminal irregularities of 10-20%.  Distally, there is a moderate   caliber posterior descending artery with ostial proximal concentric 50-60%   stenosis.    IMPRESSION:  1.  Coronary artery disease with occluded proximal and mid left anterior   descending artery.  2.  Successful thrombectomy/percutaneous transluminal coronary angioplasty,  stent placement of the proximal left anterior descending artery with 2.5x20 mm   Synergy drug-eluting stent.  3.  Successful percutaneous transluminal coronary angioplasty,stent placement   of the mid left anterior descending artery with 2.25x28 mm Synergy   drug-eluting stent.  4.  Reduced left ventricular systolic function with ejection fraction of 30%.  5.  Elevated left ventricular end-diastolic pressure.    RECOMMENDATIONS:  Recommend medical therapy with addition of Brilinta, smoking  Cessation.    SEDATION TIME: The patient's sedation was managed by myself with continuous   face to face time with the patient for 15 minutes from 07:02 to 07:17.           ____________________________________     MD CARLOS SIMS / LILIANE    DD:  08/21/2019 08:07:40  DT:  08/21/2019 08:34:11    D#:  [id number]  Job#:  [id number]

## 2019-08-21 NOTE — DISCHARGE PLANNING
Medical Social Work    Referral: STEMI    Intervention: Pt is a 64 year old male brought in by MARLENA from home.  Pt is Tan Moore (: 1955).  Pt states that his emergency contact is his sister, Sapphire ([phone]); however, he states that he will call her later.    Plan: SW will follow as needed.

## 2019-08-21 NOTE — PROGRESS NOTES
TR band removed. Site is clean, dry, soft. Gauze and tegaderm dressing applied. Patient understands importance of using right wrist carefully.

## 2019-08-21 NOTE — PROGRESS NOTES
Notified Dr. Smith of patient's complaints, reviewed EKG, no changes noted. Orders for xanax one time dose, and CXR.

## 2019-08-21 NOTE — CONSULTS
DATE OF SERVICE:  08/21/2019    CARDIOLOGY CONSULTATION    REFERRING PHYSICIAN:  Radha Lemons    CHIEF COMPLAINT:  1.  Chest pain.  2.  Acute anterior myocardial infarction/STEMI.    HISTORY OF PRESENT ILLNESS:  The patient is a 64-year-old  with   past medical history significant for hypertension and chronic tobacco abuse.  He   was well until this morning at 5:00 when he awoke with 9/10 chest pain, which   is still persistent.  The patient was brought to [facility] and was  diagnosed with acute anterior myocardial infarction.  STEMI was activated.    ALLERGIES:  No known drug allergies.    MEDICATIONS:  Metoprolol.    PAST MEDICAL ILLNESS:  As above.    PAST SURGICAL HISTORY:  Cervical laminectomy.    SOCIAL HISTORY:  He is single, positive for tobacco abuse.    FAMILY HISTORY:  Unremarkable for early coronary artery disease.    REVIEW OF SYSTEMS:  Full review of system unable to be obtained as he is   suffering 9/10 chest pain.    PHYSICAL EXAMINATION:  Includes:  VITAL SIGNS:  Pulse 60, /70, respiration 18.  GENERAL:  A 64-year-old male, in acute distress.  HEENT:  Head normocephalic, atraumatic.  Eyes equal, oral moist.  NECK:  Supple.  RESPIRATORY:  Clear to auscultation bilaterally.  Good effort.  CARDIOVASCULAR:  S1, S2, regular rate and rhythm without significant murmur.  ABDOMEN:  Soft, nondistended, nontender.  No hepatosplenomegaly noted.  EXTREMITIES:  Upper extremities, no clubbing, cyanosis.  Lower extremity, no   edema.  NEUROLOGIC:  Alert and oriented x3.  PSYCHIATRIC:  The patient appears very anxious.  SKIN:  Diaphoretic.    CARDIAC STUDIES AND PROCEDURES:      EKG performed on 08/02/2019 was reviewed: EKG shows sinus rhythm with   large ST elevation of the anterior leads.    ASSESSMENT AND PLAN:  1.  Chest pain with acute anterior myocardial infarction/ST-elevation   myocardial infarction:  The patient is a 64-year-old  with past medical   history significant for  hypertension and chronic tobacco abuse.  The patient   presents with chest pain consistent with angina and EKG consistent with acute   anterior myocardial infarction.  STEMI was activated.  He will be brought to   [facility] catheterization laboratory for urgent percutaneous intervention.  He   understands the risks and benefits and agrees with plan.  2.  Hypertension:  Continue beta blockade therapy.  3.  Chronic tobacco abuse:  Smoking cessation will be discussed on this visit.    Thank you for this consult.       ____________________________________     MD CARLOS SIMS / LILIANE    DD:  08/21/2019 06:57:40  DT:  08/21/2019 07:21:25    D#:  [id number]  Job#:  [id number]

## 2019-08-21 NOTE — ASSESSMENT & PLAN NOTE
Nicotine patch prn  Tobacco use contributory to heart disease  Counseled regarding adverse effects of smoking

## 2019-08-21 NOTE — ASSESSMENT & PLAN NOTE
S/p thrombectomy and 2 LAD stent placement per heart cath  STEMI  On angiomax  EF 30%, likely ischemic  On bb, lisinopril and statin  On metoprolol outpt

## 2019-08-21 NOTE — CARE PLAN
Problem: Safety  Goal: Will remain free from injury  Note:   Call light within reach, bed alarm on, patient understands not to get up without RN present     Problem: Knowledge Deficit  Goal: Knowledge of disease process/condition, treatment plan, diagnostic tests, and medications will improve  Note:   Provided education regarding STEMI, medications, EKG and labs

## 2019-08-21 NOTE — CONSULTS
Critical Care Consultation    Date of consult: 8/21/2019    Referring Physician  Newton Espinoza    Reason for Consultation  STEMI    History of Presenting Illness  64 y.o. male who presented 8/21/2019 with chest pain on awakening overnight.  At 7 am he decided to go to ER with continued chest pain.  Felt like someone was sitting on his chest.  He presented to the emergency room and was diagnosed with a STEMI and Cath Lab had a LAD stent placed times 2.  He is currently on bilivirudin and resting in bed.  He says prior day he was out in the yard lifting a trailer.  He had some chest discomfort though he thought this was related to his musculoskeletal chronic pain of which he has cervical stenosis.  He had one more episode but once again he did not think this was related to his heart.  He says that he has had a prior diagnosis of possible MI and this was over in [place] of which he was sent home on metoprolol.  He was supposed to follow-up with his physician here in [place] however he says nothing was further done.  Denies any prior history of stents.  He does have a history of smoking.  He has smoked for approximately 45 pack years.  And this is current.  He denies being on any inhalers for COPD.  [facility] [place].  He denies any drug use and does not have significant alcohol intake.  Per cath report he has an EF of 30%.  He denies any history of congestive heart failure.    Code Status  Full Code    Review of Systems  Review of Systems   Constitutional: Negative for chills, diaphoresis, fever, malaise/fatigue and weight loss.   HENT: Negative for congestion and sinus pain.    Eyes: Negative for blurred vision, double vision and photophobia.   Respiratory: Negative for cough, hemoptysis, sputum production, shortness of breath, wheezing and stridor.    Cardiovascular: Positive for chest pain. Negative for palpitations and leg swelling.   Gastrointestinal: Negative for blood in stool,  heartburn, melena and vomiting.   Genitourinary: Negative for dysuria and urgency.   Musculoskeletal: Positive for neck pain. Negative for back pain and myalgias.   Skin: Negative for itching and rash.   Neurological: Negative for dizziness, tingling, sensory change, speech change, focal weakness and headaches.   Endo/Heme/Allergies: Negative for polydipsia. Does not bruise/bleed easily.   Psychiatric/Behavioral: Negative for depression. The patient is not nervous/anxious.        Past Medical History   has a past medical history of Anxiety, Arthritis, Bilateral knee pain, Chronic low back pain, Chronic neck pain, Depression, and Hypertension.    Surgical History   has a past surgical history that includes athroplasty and laminotomy (1988).    Family History  family history is not on file.    Social History   reports that he has been smoking cigarettes. He has a 46.00 pack-year smoking history. He has never used smokeless tobacco. He reports that he does not drink alcohol.    Medications  Home Medications     Reviewed by Arvind Trinidad (Pharmacy Tech) on 08/21/19 at 0812  Med List Status: Complete   Medication Last Dose Status   amitriptyline (ELAVIL) 50 MG Tab UNK Active   buprenorphine-naloxone (SUBOXONE) 8-2 MG SL Tab UNK Active   metoprolol (LOPRESSOR) 25 MG Tab UNK Active   tramadol (ULTRAM) 50 MG Tab UNK Active              Current Facility-Administered Medications   Medication Dose Route Frequency Provider Last Rate Last Dose   • amitriptyline (ELAVIL) tablet 25 mg  25 mg Oral Nightly Newton Espinoza M.D.       • gemfibrozil (LOPID) tablet 600 mg  600 mg Oral BID Newton Espinoza M.D.       • HYDROcodone-acetaminophen (NORCO) 5-325 MG per tablet 1 Tab  1 Tab Oral Q4HRS PRN Newton Espinoza M.D.       • lisinopril (PRINIVIL) tablet 40 mg  40 mg Oral DAILY Newton Espinoza M.D.       • morphine (MS IR) tablet 15 mg  15 mg Oral BID PRN Newton Espinoza M.D.       • senna-docusate (PERICOLACE or SENOKOT S) 8.6-50 MG per tablet 2  Tab  2 Tab Oral BID Newton Espinoza M.D.        And   • polyethylene glycol/lytes (MIRALAX) PACKET 1 Packet  1 Packet Oral QDAY PRN Newton Espinoza M.D.        And   • magnesium hydroxide (MILK OF MAGNESIA) suspension 30 mL  30 mL Oral QDAY PRN Newton Espinoza M.D.        And   • bisacodyl (DULCOLAX) suppository 10 mg  10 mg Rectal QDAY PRN Newton Espinoza M.D.       • acetaminophen (TYLENOL) tablet 650 mg  650 mg Oral Q6HRS PRN Newton Espinoza M.D.       • atorvastatin (LIPITOR) tablet 80 mg  80 mg Oral Q EVENING Newton Espinoza M.D.       • metoprolol (LOPRESSOR) tablet 25 mg  25 mg Oral TWICE DAILY eNwton Espinoza M.D.       • nitroglycerin (NITROSTAT) tablet 0.4 mg  0.4 mg Sublingual Q5 MIN PRN Newton Espinoza M.D.       • morphine (pf) 4 mg/ml injection 2-4 mg  2-4 mg Intravenous Q5 MIN PRN Newton Espinoza M.D.       • ondansetron (ZOFRAN) syringe/vial injection 4 mg  4 mg Intravenous Q4HRS PRN Newton Espinoza M.D.       • ondansetron (ZOFRAN ODT) dispertab 4 mg  4 mg Oral Q4HRS PRN Newton Espinoza M.D.       • promethazine (PHENERGAN) tablet 12.5-25 mg  12.5-25 mg Oral Q4HRS PRN Newton Espinoza M.D.       • promethazine (PHENERGAN) suppository 12.5-25 mg  12.5-25 mg Rectal Q4HRS PRN Newton Espinoza M.D.       • prochlorperazine (COMPAZINE) injection 5-10 mg  5-10 mg Intravenous Q4HRS PRN Newton Espinoza M.D.       • bivalirudin (ANGIOMAX) 250 mg in NS 50 mL Infusion  0.2 mg/kg/hr Intravenous Continuous Omari Carlisle M.D. 2.9 mL/hr at 08/21/19 0815 0.2 mg/kg/hr at 08/21/19 0815   • [START ON 8/22/2019] aspirin EC (ECOTRIN) tablet 81 mg  81 mg Oral DAILY Omari Carlisle M.D.       • ticagrelor (BRILINTA) tablet 90 mg  90 mg Oral BID Omari Carlisle M.D.       • lisinopril (PRINIVIL) 10 MG tablet 5 mg  5 mg Oral Q12HRS Omari Carlisle M.D.           Allergies  No Known Allergies    Vital Signs last 24 hours  Temp:  [35.7 °C (96.3 °F)] 35.7 °C (96.3 °F)  Pulse:  [57-60] 57  Resp:  [15] 15  BP: (89)/(63) 89/63  SpO2:  [97 %] 97 %    Physical Exam  Physical Exam    Constitutional: He is oriented to person, place, and time. He appears well-developed and well-nourished. No distress.   HENT:   Head: Normocephalic and atraumatic.   Right Ear: External ear normal.   Left Ear: External ear normal.   Mouth/Throat: No oropharyngeal exudate.   Eyes: Pupils are equal, round, and reactive to light. Conjunctivae and EOM are normal. Right eye exhibits no discharge. Left eye exhibits no discharge.   Neck: No JVD present. No tracheal deviation present.   Cardiovascular: Normal rate, regular rhythm and normal heart sounds.   No murmur heard.  Pulmonary/Chest: Effort normal and breath sounds normal. No stridor. No respiratory distress. He has no wheezes. He has no rales. He exhibits no tenderness.   Adequate air movement   Abdominal: He exhibits no mass. There is no tenderness. There is no rebound and no guarding.   Musculoskeletal: He exhibits no edema, tenderness or deformity.   Neurological: He is alert and oriented to person, place, and time. He displays normal reflexes. No cranial nerve deficit. Coordination normal.   Skin: Skin is warm and dry. No rash noted. He is not diaphoretic. No erythema.   Psychiatric: He has a normal mood and affect. His behavior is normal.   Nursing note and vitals reviewed.      Fluids  No intake or output data in the 24 hours ending 19 0910    Laboratory  Recent Results (from the past 48 hour(s))   EKG    Collection Time: 19  6:40 AM   Result Value Ref Range    Report       [facility]    Test Date:  2019  Pt Name:    MARIBEL SCHOFIELD              Department: ER  MRN:        [id number]                      Room:       TRAUMA - EXAM 1  Gender:     Male                         Technician: [id number]  :        1955                   Requested By:ER TRIAGE PROTOCOL  Order #:    [id number]                    Reading MD:    Measurements  Intervals                                Axis  Rate:       68                            P:          78  PA:         182                          QRS:        -54  QRSD:       106                          T:          9  QT:         418  QTc:        445    Interpretive Statements  Sinus rhythm  LAD, consider left anterior fascicular block  Probable anteroseptal infarct, acute  Abnormal T, consider ischemia, inferior leads  No previous ECG available for comparison     TROPONIN    Collection Time: 08/21/19  6:44 AM   Result Value Ref Range    Troponin T 33 (H) 6 - 19 ng/L   proBrain Natriuretic Peptide, NT    Collection Time: 08/21/19  6:44 AM   Result Value Ref Range    NT-proBNP 211 (H) 0 - 125 pg/mL   CBC WITH DIFFERENTIAL    Collection Time: 08/21/19  6:44 AM   Result Value Ref Range    WBC 7.6 4.8 - 10.8 K/uL    RBC 4.48 (L) 4.70 - 6.10 M/uL    Hemoglobin 14.2 14.0 - 18.0 g/dL    Hematocrit 43.3 42.0 - 52.0 %    MCV 96.7 81.4 - 97.8 fL    MCH 31.7 27.0 - 33.0 pg    MCHC 32.8 (L) 33.7 - 35.3 g/dL    RDW 47.7 35.9 - 50.0 fL    Platelet Count 202 164 - 446 K/uL    MPV 9.7 9.0 - 12.9 fL    Neutrophils-Polys 60.10 44.00 - 72.00 %    Lymphocytes 26.70 22.00 - 41.00 %    Monocytes 10.00 0.00 - 13.40 %    Eosinophils 2.40 0.00 - 6.90 %    Basophils 0.50 0.00 - 1.80 %    Immature Granulocytes 0.30 0.00 - 0.90 %    Nucleated RBC 0.00 /100 WBC    Neutrophils (Absolute) 4.57 1.82 - 7.42 K/uL    Lymphs (Absolute) 2.03 1.00 - 4.80 K/uL    Monos (Absolute) 0.76 0.00 - 0.85 K/uL    Eos (Absolute) 0.18 0.00 - 0.51 K/uL    Baso (Absolute) 0.04 0.00 - 0.12 K/uL    Immature Granulocytes (abs) 0.02 0.00 - 0.11 K/uL    NRBC (Absolute) 0.00 K/uL   COMP METABOLIC PANEL    Collection Time: 08/21/19  6:44 AM   Result Value Ref Range    Sodium 139 135 - 145 mmol/L    Potassium 4.1 3.6 - 5.5 mmol/L    Chloride 105 96 - 112 mmol/L    Co2 23 20 - 33 mmol/L    Anion Gap 11.0 0.0 - 11.9    Glucose 112 (H) 65 - 99 mg/dL    Bun 21 8 - 22 mg/dL    Creatinine 0.98 0.50 - 1.40 mg/dL    Calcium 9.4 8.5 - 10.5 mg/dL    AST(SGOT)  15 12 - 45 U/L    ALT(SGPT) 14 2 - 50 U/L    Alkaline Phosphatase 69 30 - 99 U/L    Total Bilirubin 0.3 0.1 - 1.5 mg/dL    Albumin 3.9 3.2 - 4.9 g/dL    Total Protein 6.8 6.0 - 8.2 g/dL    Globulin 2.9 1.9 - 3.5 g/dL    A-G Ratio 1.3 g/dL   LIPASE    Collection Time: 19  6:44 AM   Result Value Ref Range    Lipase 4 (L) 11 - 82 U/L   PROTHROMBIN TIME    Collection Time: 19  6:44 AM   Result Value Ref Range    PT 12.9 12.0 - 14.6 sec    INR 0.95 0.87 - 1.13   APTT    Collection Time: 19  6:44 AM   Result Value Ref Range    APTT 29.9 24.7 - 36.0 sec   ESTIMATED GFR    Collection Time: 19  6:44 AM   Result Value Ref Range    GFR If African American >60 >60 mL/min/1.73 m 2    GFR If Non African American >60 >60 mL/min/1.73 m 2   EKG - STAT Once    Collection Time: 19  8:25 AM   Result Value Ref Range    Report       [facility]    Test Date:  2019  Pt Name:    MARIBEL SCHOFIELD              Department: ER  MRN:        [id number]                      Room:       05  Gender:     Male                         Technician: GLORIA  :        1955                   Requested By:JOSE MARIA ARMIJO  Order #:    [id number]                    Reading MD:    Measurements  Intervals                                Axis  Rate:       53                           P:          58  TX:         194                          QRS:        15  QRSD:       106                          T:          75  QT:         427  QTc:        401    Interpretive Statements  SINUS BRADYCARDIA  ANTEROSEPTAL INFARCT, OLD  Compared to ECG 2019 06:40:41  Sinus rhythm no longer present  T-wave abnormality no longer present  Possible ischemia no longer present  Myocardial infarct finding still present         Imaging  CL-LEFT HEART CATHETERIZATION WITH POSSIBLE INTERVENTION    (Results Pending)   EC-ECHOCARDIOGRAM COMPLETE W/O CONT    (Results Pending)       Assessment/Plan  STEMI (ST elevation myocardial infarction) (HCC)-  (present on admission)  Assessment & Plan  S/p thrombectomy and 2 LAD stent placement per heart cath  STEMI  On angiomax  EF 30%, likely ischemic  On bb, lisinopril and statin  On metoprolol outpt    Acute systolic heart failure (HCC)  Assessment & Plan  Heart cath 30% ef  Pending echo  On lisinopril, metoprolol and statin  Prior metoprolol use  Per patient no hx of heart failure  Drug screen, he denies drug use  Tobacco use contributory    Nicotine abuse  Assessment & Plan  Nicotine patch prn  Tobacco use contributory to heart disease  Counseled regarding adverse effects of smoking    Essential hypertension  Assessment & Plan  Keep sbp < 160    Hyperglycemia- (present on admission)  Assessment & Plan  Keep less than 180  Avoid hypoglycemia      Discussed patient condition and risk of morbidity and/or mortality with Hospitalist, RN, RT, Pharmacy, Code status disscussed, Patient and cardiology   .    The patient remains critically ill.  He is s/p STEMI with stent placed this morning and now in ICU for close cardiac monitoring with congestive heart failure likely from ischemic disease with EF 30%.  He is at high risk of cardiopulmonary arrest secondary to arrythmia of which include death without critical care management and monitoring.  Critical care time = 36 minutes in directly providing and coordinating critical care and extensive data review.  No time overlap and excludes procedures.

## 2019-08-21 NOTE — H&P
Hospital Medicine History & Physical Note    Date of Service  8/21/2019    Primary Care Physician  Pcp Unknown    Consultants  Dr. Carlisle    Code Status  full    Chief Complaint  Chest pain    History of Presenting Illness  64 y.o. male with PMH of HTN, chronic smoker, DLD who presented 8/21/2019 with   sudden onset of chest pain around 5:00 this morning.  He described the pain as pressure-like sensation over the sternal area, radiated to his left arm area, 10 out of 10 intensity.  He waited for a few hours until 7:00 this morning and finally he called 911.  He was found to have ST elevation MI and code STEMI was called.  Cardiology was consulted immediately and patient was taken to cardiac catheterization.  I saw and examined the patient at bedside.  He had 2 stent placed in mid and proximal LAD.  Currently patient is chest pain-free.  Patient will be monitored in cardiac ICU for further evaluation and management.    Review of Systems  Review of Systems   Constitutional: Negative for chills, fever and weight loss.   HENT: Negative for congestion and nosebleeds.    Eyes: Negative for blurred vision, pain, discharge and redness.   Respiratory: Negative for cough, sputum production, shortness of breath and stridor.    Cardiovascular: Positive for chest pain. Negative for palpitations and orthopnea.   Gastrointestinal: Negative for abdominal pain, diarrhea, heartburn, nausea and vomiting.   Genitourinary: Negative for dysuria, frequency and urgency.   Musculoskeletal: Negative for back pain, myalgias and neck pain.   Skin: Negative for itching and rash.   Neurological: Negative for dizziness, focal weakness, seizures and headaches.   Psychiatric/Behavioral: Negative for depression. The patient is not nervous/anxious and does not have insomnia.        Past Medical History   has a past medical history of Anxiety, Arthritis, Bilateral Knee Pain, Chronic Low Back Pain, Chronic Neck Pain, Depression, and  Hypertension.    Surgical History   has a past surgical history that includes athroplasty and laminotomy (1988).     Family History  Reviewed, no pertinent family history     Social History    Patient smoked a few cigarettes a day, denies alcohol drinking and illicit drug use    Allergies  No Known Allergies    Medications  Prior to Admission Medications   Prescriptions Last Dose Informant Patient Reported? Taking?   AMITRIPTYLINE HCL 25 MG PO TABS   Yes No   Sig: Take 25 mg by mouth every evening.   GEMFIBROZIL 600 MG PO TABS   No No   Sig: Take 1 Tab by mouth 2 times a day.   HYDROCODONE-ACETAMINOPHEN 5-500 MG PO TABS   Yes No   Sig: Take 1-2 Tabs by mouth every four hours as needed for Mild Pain.   LISINOPRIL 40 MG PO TABS   Yes No   Sig: Take 40 mg by mouth every day.   MORPHINE SULFATE CR 15 MG PO TB12   No No   Sig: Take 1 Tab by mouth every 12 hours.   PROMETHAZINE HCL 25 MG PO TABS   No No   Sig: Take 1 Tab by mouth every 6 hours as needed for Nausea/Vomiting.      Facility-Administered Medications: None       Physical Exam     Vital: Temperature 96.3, heart rate 60, respiratory rate 15, blood pressure 89/63, satting 95% on 2 L oxygen    Physical Exam   Constitutional: He is oriented to person, place, and time. No distress.   HENT:   Head: Normocephalic and atraumatic.   Mouth/Throat: Oropharynx is clear and moist.   Eyes: Pupils are equal, round, and reactive to light. Conjunctivae and EOM are normal.   Neck: Normal range of motion. Neck supple. No tracheal deviation present. No thyromegaly present.   Cardiovascular: Normal rate and regular rhythm.   No murmur heard.  Pulmonary/Chest: Effort normal and breath sounds normal. No respiratory distress. He has no wheezes.   Abdominal: Soft. Bowel sounds are normal. He exhibits no distension. There is no tenderness.   Musculoskeletal: He exhibits no edema or tenderness.   Neurological: He is alert and oriented to person, place, and time. No cranial nerve deficit.    Skin: Skin is warm and dry. He is not diaphoretic. No erythema.   Psychiatric: He has a normal mood and affect. His behavior is normal. Thought content normal.   Nursing note and vitals reviewed.      Laboratory:  Recent Labs     08/21/19  0644   WBC 7.6   RBC 4.48*   HEMOGLOBIN 14.2   HEMATOCRIT 43.3   MCV 96.7   MCH 31.7   MCHC 32.8*   RDW 47.7   PLATELETCT 202   MPV 9.7     Recent Labs     08/21/19  0644   SODIUM 139   POTASSIUM 4.1   CHLORIDE 105   CO2 23   GLUCOSE 112*   BUN 21   CREATININE 0.98   CALCIUM 9.4     Recent Labs     08/21/19  0644   ALTSGPT 14   ASTSGOT 15   ALKPHOSPHAT 69   TBILIRUBIN 0.3   LIPASE 4*   GLUCOSE 112*     Recent Labs     08/21/19  0644   APTT 29.9   INR 0.95     Recent Labs     08/21/19  0644   NTPROBNP 211*         Recent Labs     08/21/19  0644   TROPONINT 33*       Urinalysis:    No results found     Imaging:  CL-LEFT HEART CATHETERIZATION WITH POSSIBLE INTERVENTION    (Results Pending)   CL-RIGHT AND LEFT HEART CATHETERIZATION    (Results Pending)   EC-ECHOCARDIOGRAM COMPLETE W/O CONT    (Results Pending)         Assessment/Plan:  I anticipate this patient will require at least two midnights for appropriate medical management, necessitating inpatient admission.    STEMI (ST elevation myocardial infarction) (HCC)- (present on admission)  Assessment & Plan  Angiogram:  1.  Coronary artery disease with occluded proximal and mid left anterior   descending artery.  2.  Successful thrombectomy/percutaneous transluminal coronary   angioplasty/stent placement of the proximal left anterior descending artery   with 2.5x20 mm Synergy drug-eluting stent.  3.  Successful percutaneous transluminal coronary angioplasty/stent placement   of the mid left anterior descending artery with 2.25x28 mm Synergy   drug-eluting stent.  4.  Reduced left ventricular systolic function with ejection fraction of 30%.  5.  Elevated left ventricular end-diastolic pressure.  I ordered echo  I started the  patient on asa, brilinta, atorvastatin, metoprolol  Nitro as needed  Card following      Nicotine abuse  Assessment & Plan  Tobacco cessation education provided for more than 11 minutes, discussed options of nicotine patch, medical treatment with wellbutrin and chantix. Discussed the risks of smoking including increased risk of heart disease, stroke, cancer and COPD. Discussed the benefits of quitting smoking. Nicotine replacement protocol will be provided to the patient.    Essential hypertension  Assessment & Plan  On lisinopril, metoprolol  Adjust as needed    Hyperglycemia- (present on admission)  Assessment & Plan  No history of DM      VTE prophylaxis: lovenox

## 2019-08-21 NOTE — PROGRESS NOTES
Patient arrived from cath lab with 2 RN transport at 0815. Connected to monitor, lines and drips verified. 2L O2, angiomax verified, see MAR. Patient has no complaints of chest pain, EKG done and reviewed. TR band in place with 13 ml. Updated patient on plan of care.

## 2019-08-22 ENCOUNTER — PATIENT OUTREACH (OUTPATIENT)
Dept: HEALTH INFORMATION MANAGEMENT | Facility: OTHER | Age: 64
End: 2019-08-22

## 2019-08-22 ENCOUNTER — APPOINTMENT (OUTPATIENT)
Dept: CARDIOLOGY | Facility: MEDICAL CENTER | Age: 64
DRG: 228 | End: 2019-08-22
Attending: INTERNAL MEDICINE
Payer: COMMERCIAL

## 2019-08-22 PROBLEM — G89.29 CHRONIC NECK PAIN: Status: ACTIVE | Noted: 2019-08-22

## 2019-08-22 PROBLEM — M54.2 CHRONIC NECK PAIN: Status: ACTIVE | Noted: 2019-08-22

## 2019-08-22 LAB
ALBUMIN SERPL BCP-MCNC: 3.5 G/DL (ref 3.2–4.9)
ALBUMIN/GLOB SERPL: 1.3 G/DL
ALP SERPL-CCNC: 71 U/L (ref 30–99)
ALT SERPL-CCNC: 34 U/L (ref 2–50)
ANION GAP SERPL CALC-SCNC: 6 MMOL/L (ref 0–11.9)
AST SERPL-CCNC: 124 U/L (ref 12–45)
BILIRUB SERPL-MCNC: 0.5 MG/DL (ref 0.1–1.5)
BUN SERPL-MCNC: 15 MG/DL (ref 8–22)
CALCIUM SERPL-MCNC: 9.2 MG/DL (ref 8.5–10.5)
CHLORIDE SERPL-SCNC: 106 MMOL/L (ref 96–112)
CHOLEST SERPL-MCNC: 147 MG/DL (ref 100–199)
CO2 SERPL-SCNC: 24 MMOL/L (ref 20–33)
CREAT SERPL-MCNC: 0.82 MG/DL (ref 0.5–1.4)
EKG IMPRESSION: NORMAL
EKG IMPRESSION: NORMAL
ERYTHROCYTE [DISTWIDTH] IN BLOOD BY AUTOMATED COUNT: 46.5 FL (ref 35.9–50)
GLOBULIN SER CALC-MCNC: 2.7 G/DL (ref 1.9–3.5)
GLUCOSE SERPL-MCNC: 102 MG/DL (ref 65–99)
HCT VFR BLD AUTO: 40.8 % (ref 42–52)
HDLC SERPL-MCNC: 34 MG/DL
HGB BLD-MCNC: 13.4 G/DL (ref 14–18)
LDLC SERPL CALC-MCNC: 91 MG/DL
LV EJECT FRACT  99904: 55
LV EJECT FRACT MOD 2C 99903: 68.59
LV EJECT FRACT MOD 4C 99902: 62.67
LV EJECT FRACT MOD BP 99901: 63.8
MCH RBC QN AUTO: 31.2 PG (ref 27–33)
MCHC RBC AUTO-ENTMCNC: 32.8 G/DL (ref 33.7–35.3)
MCV RBC AUTO: 94.9 FL (ref 81.4–97.8)
PLATELET # BLD AUTO: 206 K/UL (ref 164–446)
PMV BLD AUTO: 9.9 FL (ref 9–12.9)
POTASSIUM SERPL-SCNC: 4.1 MMOL/L (ref 3.6–5.5)
PROT SERPL-MCNC: 6.2 G/DL (ref 6–8.2)
RBC # BLD AUTO: 4.3 M/UL (ref 4.7–6.1)
SODIUM SERPL-SCNC: 136 MMOL/L (ref 135–145)
TRIGL SERPL-MCNC: 111 MG/DL (ref 0–149)
TROPONIN T SERPL-MCNC: 3347 NG/L (ref 6–19)
WBC # BLD AUTO: 10.2 K/UL (ref 4.8–10.8)

## 2019-08-22 PROCEDURE — 700102 HCHG RX REV CODE 250 W/ 637 OVERRIDE(OP): Performed by: INTERNAL MEDICINE

## 2019-08-22 PROCEDURE — 93010 ELECTROCARDIOGRAM REPORT: CPT | Performed by: INTERNAL MEDICINE

## 2019-08-22 PROCEDURE — 99232 SBSQ HOSP IP/OBS MODERATE 35: CPT | Performed by: HOSPITALIST

## 2019-08-22 PROCEDURE — 700102 HCHG RX REV CODE 250 W/ 637 OVERRIDE(OP): Performed by: HOSPITALIST

## 2019-08-22 PROCEDURE — 84484 ASSAY OF TROPONIN QUANT: CPT

## 2019-08-22 PROCEDURE — 700111 HCHG RX REV CODE 636 W/ 250 OVERRIDE (IP): Performed by: INTERNAL MEDICINE

## 2019-08-22 PROCEDURE — 93010 ELECTROCARDIOGRAM REPORT: CPT | Mod: 77 | Performed by: INTERNAL MEDICINE

## 2019-08-22 PROCEDURE — 770020 HCHG ROOM/CARE - TELE (206)

## 2019-08-22 PROCEDURE — 80061 LIPID PANEL: CPT

## 2019-08-22 PROCEDURE — 85027 COMPLETE CBC AUTOMATED: CPT

## 2019-08-22 PROCEDURE — 93306 TTE W/DOPPLER COMPLETE: CPT

## 2019-08-22 PROCEDURE — A9270 NON-COVERED ITEM OR SERVICE: HCPCS | Performed by: HOSPITALIST

## 2019-08-22 PROCEDURE — A9270 NON-COVERED ITEM OR SERVICE: HCPCS | Performed by: INTERNAL MEDICINE

## 2019-08-22 PROCEDURE — 80053 COMPREHEN METABOLIC PANEL: CPT

## 2019-08-22 PROCEDURE — 99233 SBSQ HOSP IP/OBS HIGH 50: CPT | Performed by: INTERNAL MEDICINE

## 2019-08-22 PROCEDURE — 93306 TTE W/DOPPLER COMPLETE: CPT | Mod: 26 | Performed by: INTERNAL MEDICINE

## 2019-08-22 PROCEDURE — 97162 PT EVAL MOD COMPLEX 30 MIN: CPT

## 2019-08-22 PROCEDURE — 97535 SELF CARE MNGMENT TRAINING: CPT

## 2019-08-22 RX ORDER — SPIRONOLACTONE 25 MG/1
25 TABLET ORAL
Status: DISCONTINUED | OUTPATIENT
Start: 2019-08-22 | End: 2019-08-23 | Stop reason: HOSPADM

## 2019-08-22 RX ORDER — METOPROLOL SUCCINATE 25 MG/1
25 TABLET, EXTENDED RELEASE ORAL
Status: DISCONTINUED | OUTPATIENT
Start: 2019-08-22 | End: 2019-08-23 | Stop reason: HOSPADM

## 2019-08-22 RX ADMIN — TICAGRELOR 90 MG: 90 TABLET ORAL at 05:00

## 2019-08-22 RX ADMIN — BUPRENORPHINE HYDROCHLORIDE AND NALOXONE HYDROCHLORIDE DIHYDRATE 1 TABLET: 8; 2 TABLET SUBLINGUAL at 05:00

## 2019-08-22 RX ADMIN — BUPRENORPHINE HYDROCHLORIDE AND NALOXONE HYDROCHLORIDE DIHYDRATE 1 TABLET: 8; 2 TABLET SUBLINGUAL at 17:33

## 2019-08-22 RX ADMIN — ATORVASTATIN CALCIUM 80 MG: 80 TABLET, FILM COATED ORAL at 17:29

## 2019-08-22 RX ADMIN — SPIRONOLACTONE 25 MG: 25 TABLET ORAL at 09:38

## 2019-08-22 RX ADMIN — TICAGRELOR 90 MG: 90 TABLET ORAL at 17:29

## 2019-08-22 RX ADMIN — AMITRIPTYLINE HYDROCHLORIDE 25 MG: 10 TABLET, FILM COATED ORAL at 19:54

## 2019-08-22 RX ADMIN — LISINOPRIL 5 MG: 10 TABLET ORAL at 04:41

## 2019-08-22 RX ADMIN — ASPIRIN 81 MG: 81 TABLET, COATED ORAL at 05:00

## 2019-08-22 RX ADMIN — LISINOPRIL 5 MG: 10 TABLET ORAL at 17:29

## 2019-08-22 RX ADMIN — TRAMADOL HYDROCHLORIDE 50 MG: 50 TABLET, FILM COATED ORAL at 09:40

## 2019-08-22 RX ADMIN — SENNOSIDES, DOCUSATE SODIUM 2 TABLET: 50; 8.6 TABLET, FILM COATED ORAL at 17:29

## 2019-08-22 RX ADMIN — METOPROLOL SUCCINATE 25 MG: 25 TABLET, EXTENDED RELEASE ORAL at 17:29

## 2019-08-22 ASSESSMENT — COGNITIVE AND FUNCTIONAL STATUS - GENERAL
MOBILITY SCORE: 24
SUGGESTED CMS G CODE MODIFIER MOBILITY: CH

## 2019-08-22 ASSESSMENT — GAIT ASSESSMENTS
DISTANCE (FEET): 500
GAIT LEVEL OF ASSIST: SUPERVISED

## 2019-08-22 ASSESSMENT — ENCOUNTER SYMPTOMS
SHORTNESS OF BREATH: 0
NECK PAIN: 1
COUGH: 0
CHILLS: 0
FEVER: 0
PALPITATIONS: 0

## 2019-08-22 NOTE — PROGRESS NOTES
Page out to Dr. Weiner regarding patient's complaint of pain. Complains of mid/upper back pain similar to what he experienced before coming to ER, and reports tramadol did not help him this afternoon. Per Dr. Smith, patient reports experiencing similar pain last night but attributed it to musculoskeletal pain after working in the yard.    Dr. Smith ordered CXR, reviewed image at bedside.    Discussed patient status with Dr. Weiner. At this time, MD feels current plan of care is appropriate.

## 2019-08-22 NOTE — PROGRESS NOTES
Monitor Summary  Sinus Bradycardia with occasional PVCs  Rate 47-65  .20/.08/.44    12 Hour Chart Check complete at bedside.

## 2019-08-22 NOTE — PROGRESS NOTES
Cardiovascular Nurse Navigator ([id number]) Note:    STEMI with PCI on 8/21/19. Per cath report, EF was 30% prior to intervention.    Echocardiogram has been ordered. Intensivist is the only provider diagnosing heart failure at this point.    It is appropriate to await echocardiogram results for a picture of what LVSF is like after revascularization.    Reviewed ACS medications:  · DAPT: aspirin + ticagrelor  · Beta-Blocker:  toprol xl  · Statin:  atorva 80  · Consider for aldosterone blockade?  spironolactone  · Consider for ACE-I/ARB/ARNI?  lisinopril    Meds to Beds BEDSIDE NURSING RESPONSIBILITIES:    Please initiate Meds to Beds for dual antiplatelet therapy:     1. Obtain outpatient order for P2Y12 inhibitor (ticagrelor, clopidogrel, prasugrel) from physician   2. Call [phone] (or, if after hours/weekend, [phone] and request 'on-call anti-coag pharmacist') patient should have med in hand at time of discharge.    Intensive Cardiac Rehab (ICR) Referral:  Referred on 8/21/ has current inpatient orders for nutrition consult & PT for Phase I ICR    Demographics  Patient resides in: [place]  Insurance: Medicare    Inpatient & Discharge Patient Education:  Bedside nursing to continually provide patient education on ACS meds, signs and symptoms to monitor for, and risk factor modification.     Also at discharge please complete the “Acute Coronary Syndrome” special instructions on the AVS.          Follow up care    I've placed an order for hospital schedulers to arrange f/u within 7 calendar days of discharge in case echocardiogram comes back still showing LV dysfunction.    [facility]  Call in 1 week  Your physician has referred you to Intensive Cardiac Rehab. You cannot begin ICR for 2 weeks to allow time for your heart to heal. If you do not hear from ICR in about 1 week, please call them to schedule. Thank you!  [address]  [address]  [facility] [address] [phone]         Thank you and  please call with questions.

## 2019-08-22 NOTE — PROGRESS NOTES
[facility] Medicine Daily Progress Note    Date of Service  8/22/2019    Chief Complaint  64 y.o. male admitted 8/21/2019 with  STEMI underwent PCI to [facility]        Hospital Course          Interval Problem Update    Patient seen in the ICU  Denies chest pain  Asymptomatic bradycardia overnight    Consultants/Specialty  Cardiology  Critical care    Code Status  Full code    Disposition  Telemetry    Review of Systems  Review of Systems   Constitutional: Negative for chills and fever.   Respiratory: Negative for cough and shortness of breath.    Cardiovascular: Negative for chest pain and palpitations.   Musculoskeletal: Positive for neck pain (Chronic).   All other systems reviewed and are negative.       Physical Exam  Temp:  [35.7 °C (96.3 °F)-37 °C (98.6 °F)] 36.6 °C (97.9 °F)  Pulse:  [47-86] 51  Resp:  [7-29] 16  BP: ()/(46-87) 105/71  SpO2:  [91 %-100 %] 100 %    Physical Exam   Constitutional: He is oriented to person, place, and time. He appears well-developed and well-nourished.   HENT:   Head: Normocephalic and atraumatic.   Right Ear: External ear normal.   Left Ear: External ear normal.   Mouth/Throat: No oropharyngeal exudate.   Eyes: Conjunctivae are normal. Right eye exhibits no discharge. Left eye exhibits no discharge. No scleral icterus.   Neck: Neck supple. No JVD present. No tracheal deviation present.   Cardiovascular: Normal rate and regular rhythm. Exam reveals no gallop and no friction rub.   No murmur heard.  Pulmonary/Chest: Effort normal and breath sounds normal. No stridor. No respiratory distress. He has no wheezes. He has no rales. He exhibits no tenderness.   Abdominal: Soft. Bowel sounds are normal. He exhibits no distension and no mass. There is no tenderness. There is no rebound and no guarding.   Musculoskeletal: He exhibits no edema or tenderness.   Neurological: He is alert and oriented to person, place, and time. No cranial nerve deficit. He exhibits normal muscle tone.   Skin:  Skin is warm and dry. He is not diaphoretic. No cyanosis. Nails show no clubbing.   Psychiatric: He has a normal mood and affect. His behavior is normal. Thought content normal.   Nursing note and vitals reviewed.      Fluids    Intake/Output Summary (Last 24 hours) at 8/22/2019 0730  Last data filed at 8/22/2019 0600  Gross per 24 hour   Intake 1421.67 ml   Output 2450 ml   Net -1028.33 ml       Laboratory  Recent Labs     08/21/19  0644 08/22/19  0442   WBC 7.6 10.2   RBC 4.48* 4.30*   HEMOGLOBIN 14.2 13.4*   HEMATOCRIT 43.3 40.8*   MCV 96.7 94.9   MCH 31.7 31.2   MCHC 32.8* 32.8*   RDW 47.7 46.5   PLATELETCT 202 206   MPV 9.7 9.9     Recent Labs     08/21/19  0644 08/22/19  0442   SODIUM 139 136   POTASSIUM 4.1 4.1   CHLORIDE 105 106   CO2 23 24   GLUCOSE 112* 102*   BUN 21 15   CREATININE 0.98 0.82   CALCIUM 9.4 9.2     Recent Labs     08/21/19  0644   APTT 29.9   INR 0.95         Recent Labs     08/22/19  0442   TRIGLYCERIDE 111   HDL 34*   LDL 91       Imaging  DX-CHEST-PORTABLE (1 VIEW)   Final Result      No acute cardiac or pulmonary abnormalities are identified.      CL-LEFT HEART CATHETERIZATION WITH POSSIBLE INTERVENTION    (Results Pending)   EC-ECHOCARDIOGRAM COMPLETE W/O CONT    (Results Pending)        Assessment/Plan  STEMI (ST elevation myocardial infarction) (HCC)- (present on admission)  Assessment & Plan  Status post PCI to LAD  EF 30% on angiogram  Continue aspirin Brilinta and high-dose Lipitor  Metoprolol as tolerated  Patient started on lisinopril and Aldactone  Follow-up on echocardiogram results  Telemetry monitoring    Reinforced importance of smoking cessation and compliance with medical therapy after discharge        Chronic neck pain  Assessment & Plan  Continue home dose of Suboxone and tramadol    Nicotine abuse  Assessment & Plan  Patient counseled on cessation    Essential hypertension  Assessment & Plan  Monitor blood pressure and adjust accordingly    Hyperglycemia- (present on  admission)  Assessment & Plan  Likely reactive  Check HbA1c       VTE prophylaxis: SCD

## 2019-08-22 NOTE — PROGRESS NOTES
"64-year-old male patient with history of hypertension, smoking admitted with anterior ST elevation MI, status post primary PCI.    Interval History:  Denies chest pain shortness of breath.  Complains of back pain, spine pain because of lying in the bed.    Physical Exam   Blood pressure 126/82, pulse 71, temperature 36.4 °C (97.6 °F), temperature source Temporal, resp. rate (!) 24, height 1.753 m (5' 9\"), weight 75.4 kg (166 lb 3.6 oz), SpO2 100 %.    Constitutional:  Appears well-developed.   HENT: Normocephalic and atraumatic. No scleral icterus.   Neck: No JVD present.   Cardiovascular: Normal rate. Exam reveals no gallop and no friction rub. No murmur heard.   Pulmonary/Chest: CTAB   Abdominal: S/NT/ND BS+   Musculoskeletal: Exhibits no edema. Pulses present.  Skin: Skin is warm and dry.     ROS: As HPI other reviewed and negative       Intake/Output Summary (Last 24 hours) at 8/22/2019 0911  Last data filed at 8/22/2019 0800  Gross per 24 hour   Intake 1661.67 ml   Output 2450 ml   Net -788.33 ml       Recent Labs     08/21/19  0644 08/22/19  0442   WBC 7.6 10.2   RBC 4.48* 4.30*   HEMOGLOBIN 14.2 13.4*   HEMATOCRIT 43.3 40.8*   MCV 96.7 94.9   MCH 31.7 31.2   MCHC 32.8* 32.8*   RDW 47.7 46.5   PLATELETCT 202 206   MPV 9.7 9.9     Recent Labs     08/21/19  0644 08/22/19  0442   SODIUM 139 136   POTASSIUM 4.1 4.1   CHLORIDE 105 106   CO2 23 24   GLUCOSE 112* 102*   BUN 21 15   CREATININE 0.98 0.82   CALCIUM 9.4 9.2     Recent Labs     08/21/19  0644   APTT 29.9   INR 0.95             Recent Labs     08/22/19  0442   TRIGLYCERIDE 111   HDL 34*   LDL 91       No current facility-administered medications on file prior to encounter.      Current Outpatient Medications on File Prior to Encounter   Medication Sig Dispense Refill   • metoprolol (LOPRESSOR) 25 MG Tab Take 12.5 mg by mouth 2 times a day.     • tramadol (ULTRAM) 50 MG Tab Take 50 mg by mouth every 8 hours as needed. Indications: Pain     • " buprenorphine-naloxone (SUBOXONE) 8-2 MG SL Tab Place 1 Tab under tongue 2 Times a Day. Pain     • amitriptyline (ELAVIL) 50 MG Tab Take 50 mg by mouth every evening.         Current Facility-Administered Medications   Medication Dose Frequency Provider Last Rate Last Dose   • amitriptyline (ELAVIL) tablet 25 mg  25 mg Nightly Newton Espinoza M.D.   25 mg at 08/21/19 2008   • senna-docusate (PERICOLACE or SENOKOT S) 8.6-50 MG per tablet 2 Tab  2 Tab BID Newton Espinoza M.D.   2 Tab at 08/21/19 1700    And   • polyethylene glycol/lytes (MIRALAX) PACKET 1 Packet  1 Packet QDAY PRN Newton Espinoza M.D.        And   • magnesium hydroxide (MILK OF MAGNESIA) suspension 30 mL  30 mL QDAY PRN Newton Espinoza M.D.        And   • bisacodyl (DULCOLAX) suppository 10 mg  10 mg QDAY PRN Newton Espinoza M.D.       • acetaminophen (TYLENOL) tablet 650 mg  650 mg Q6HRS PRN Newton Espinoza M.D.       • atorvastatin (LIPITOR) tablet 80 mg  80 mg Q EVENING Newton Espinoza M.D.   80 mg at 08/21/19 1700   • metoprolol (LOPRESSOR) tablet 25 mg  25 mg TWICE DAILY Newton Espinoza M.D.   Stopped at 08/21/19 0815   • nitroglycerin (NITROSTAT) tablet 0.4 mg  0.4 mg Q5 MIN PRN Newton Espinoza M.D.       • ondansetron (ZOFRAN) syringe/vial injection 4 mg  4 mg Q4HRS PRN Newton Espinoza M.D.       • ondansetron (ZOFRAN ODT) dispertab 4 mg  4 mg Q4HRS PRN Newton Espinoza M.D.       • promethazine (PHENERGAN) tablet 12.5-25 mg  12.5-25 mg Q4HRS PRN Newton Espinoza M.D.       • promethazine (PHENERGAN) suppository 12.5-25 mg  12.5-25 mg Q4HRS PRN Newton Espinoza M.D.       • prochlorperazine (COMPAZINE) injection 5-10 mg  5-10 mg Q4HRS PRN Newton Espinoza M.D.       • aspirin EC (ECOTRIN) tablet 81 mg  81 mg DAILY Omari Carlisle M.D.   81 mg at 08/22/19 0500   • ticagrelor (BRILINTA) tablet 90 mg  90 mg BID Omari Carlisle M.D.   90 mg at 08/22/19 0500   • lisinopril (PRINIVIL) 10 MG tablet 5 mg  5 mg Q12HRS Omari Carlisle M.D.   5 mg at 08/22/19 0441   • buprenorphine-naloxone (SUBOXONE) 8-2 MG  sublingual tab 1 Tab  1 Tab BID Newton Espinoza M.D.   1 Tab at 08/22/19 0500   • tramadol (ULTRAM) 50 MG tablet 50 mg  50 mg Q8HRS PRN Dillon Nugent M.D.   50 mg at 08/21/19 1328     Last reviewed on 8/21/2019  8:12 AM by Carrie Ferguson PhT  Medications reviewed    Imaging reviewed    Cath 8/21/2019  POSTPROCEDURE DIAGNOSES:  1.  Coronary artery disease with occluded proximal and mid left anterior   descending artery.  2.  Successful thrombectomy/percutaneous transluminal coronary   angioplasty/stent placement of the proximal left anterior descending artery   with 2.5x20 mm Synergy drug-eluting stent.  3.  Successful percutaneous transluminal coronary angioplasty/stent placement   of the mid left anterior descending artery with 2.25x28 mm Synergy   drug-eluting stent.  4.  Reduced left ventricular systolic function with ejection fraction of 30%.  5.  Elevated left ventricular end-diastolic pressure.      ECHO:  Pending   640 shows sinus rhythm with anterior wrist elevation.  Postprocedure EKG shows resolution of ST changes.  Impressions:  64-year-old male patient with history of hypertension, smoking admitted with anterior ST elevation MI.  High risk medication use.  Recommendations:  No residual obstructive coronary artery disease.  His chest pain is resolved.  No arrhythmias in the monitor.  Continue aspirin, Brilinta.  Optimize medical therapy cardiomyopathy, switch to metoprolol succinate, continue lisinopril, initiate Spironolactone.  Advised to ambulate.    Discussed with primary physician and bedside nursing.    Do not hesitate to call me with questions regarding the patient care.    Anurag Andrew  Interventional cardiologist  Cell: [phone]

## 2019-08-22 NOTE — CARE PLAN
Problem: Safety  Goal: Will remain free from injury  Outcome: PROGRESSING AS EXPECTED     Problem: Infection  Goal: Will remain free from infection  Outcome: PROGRESSING AS EXPECTED     Problem: Safety  Goal: Will remain free from falls  Intervention: Assess risk factors for falls  Note:   Assessed risk factors for falls.  Intervention: Implement fall precautions  Flowsheets (Taken 8/21/2019 2047)  Bed Alarm: Patient Educated Regarding Fall Risk and Need for Bed Alarm, Understands and Continues to Refuse  Environmental Precautions: Treaded Slipper Socks on Patient; Personal Belongings, Wastebasket, Call Bell etc. in Easy Reach; Transferred to Stronger Side; Report Given to Other Health Care Providers Regarding Fall Risk; Bed in Low Position; Communication Sign for Patients & Families; Mobility Assessed & Appropriate Sign Placed  IV Pole on Same Side of Bed as Bathroom: Yes  Bedrails: Bedrails Closest to Bathroom Down  Chair/Bed Strip Alarm: Patient Educated Regarding Fall Risk and Need for Bed Alarm, Understands and Continues to Refuse

## 2019-08-23 VITALS
HEIGHT: 69 IN | BODY MASS INDEX: 23.51 KG/M2 | OXYGEN SATURATION: 99 % | RESPIRATION RATE: 16 BRPM | SYSTOLIC BLOOD PRESSURE: 104 MMHG | WEIGHT: 158.73 LBS | DIASTOLIC BLOOD PRESSURE: 65 MMHG | TEMPERATURE: 97.4 F | HEART RATE: 62 BPM

## 2019-08-23 LAB
ANION GAP SERPL CALC-SCNC: 9 MMOL/L (ref 0–11.9)
BASOPHILS # BLD AUTO: 0.4 % (ref 0–1.8)
BASOPHILS # BLD: 0.03 K/UL (ref 0–0.12)
BUN SERPL-MCNC: 17 MG/DL (ref 8–22)
CALCIUM SERPL-MCNC: 8.9 MG/DL (ref 8.5–10.5)
CHLORIDE SERPL-SCNC: 108 MMOL/L (ref 96–112)
CO2 SERPL-SCNC: 21 MMOL/L (ref 20–33)
CREAT SERPL-MCNC: 0.91 MG/DL (ref 0.5–1.4)
EOSINOPHIL # BLD AUTO: 0.13 K/UL (ref 0–0.51)
EOSINOPHIL NFR BLD: 1.8 % (ref 0–6.9)
ERYTHROCYTE [DISTWIDTH] IN BLOOD BY AUTOMATED COUNT: 45.1 FL (ref 35.9–50)
EST. AVERAGE GLUCOSE BLD GHB EST-MCNC: 123 MG/DL
GLUCOSE SERPL-MCNC: 99 MG/DL (ref 65–99)
HBA1C MFR BLD: 5.9 % (ref 0–5.6)
HCT VFR BLD AUTO: 43.1 % (ref 42–52)
HGB BLD-MCNC: 14 G/DL (ref 14–18)
IMM GRANULOCYTES # BLD AUTO: 0.03 K/UL (ref 0–0.11)
IMM GRANULOCYTES NFR BLD AUTO: 0.4 % (ref 0–0.9)
LYMPHOCYTES # BLD AUTO: 1.8 K/UL (ref 1–4.8)
LYMPHOCYTES NFR BLD: 24.5 % (ref 22–41)
MAGNESIUM SERPL-MCNC: 1.8 MG/DL (ref 1.5–2.5)
MCH RBC QN AUTO: 30.1 PG (ref 27–33)
MCHC RBC AUTO-ENTMCNC: 32.5 G/DL (ref 33.7–35.3)
MCV RBC AUTO: 92.7 FL (ref 81.4–97.8)
MONOCYTES # BLD AUTO: 0.75 K/UL (ref 0–0.85)
MONOCYTES NFR BLD AUTO: 10.2 % (ref 0–13.4)
NEUTROPHILS # BLD AUTO: 4.61 K/UL (ref 1.82–7.42)
NEUTROPHILS NFR BLD: 62.7 % (ref 44–72)
NRBC # BLD AUTO: 0 K/UL
NRBC BLD-RTO: 0 /100 WBC
PLATELET # BLD AUTO: 208 K/UL (ref 164–446)
PMV BLD AUTO: 9.7 FL (ref 9–12.9)
POTASSIUM SERPL-SCNC: 4 MMOL/L (ref 3.6–5.5)
RBC # BLD AUTO: 4.65 M/UL (ref 4.7–6.1)
SODIUM SERPL-SCNC: 138 MMOL/L (ref 135–145)
WBC # BLD AUTO: 7.4 K/UL (ref 4.8–10.8)

## 2019-08-23 PROCEDURE — 700102 HCHG RX REV CODE 250 W/ 637 OVERRIDE(OP): Performed by: INTERNAL MEDICINE

## 2019-08-23 PROCEDURE — 99232 SBSQ HOSP IP/OBS MODERATE 35: CPT | Performed by: INTERNAL MEDICINE

## 2019-08-23 PROCEDURE — 83036 HEMOGLOBIN GLYCOSYLATED A1C: CPT

## 2019-08-23 PROCEDURE — 700102 HCHG RX REV CODE 250 W/ 637 OVERRIDE(OP): Performed by: HOSPITALIST

## 2019-08-23 PROCEDURE — 83735 ASSAY OF MAGNESIUM: CPT

## 2019-08-23 PROCEDURE — 85025 COMPLETE CBC W/AUTO DIFF WBC: CPT

## 2019-08-23 PROCEDURE — 700111 HCHG RX REV CODE 636 W/ 250 OVERRIDE (IP): Performed by: INTERNAL MEDICINE

## 2019-08-23 PROCEDURE — A9270 NON-COVERED ITEM OR SERVICE: HCPCS | Performed by: INTERNAL MEDICINE

## 2019-08-23 PROCEDURE — A9270 NON-COVERED ITEM OR SERVICE: HCPCS | Performed by: HOSPITALIST

## 2019-08-23 PROCEDURE — 80048 BASIC METABOLIC PNL TOTAL CA: CPT

## 2019-08-23 PROCEDURE — 36415 COLL VENOUS BLD VENIPUNCTURE: CPT

## 2019-08-23 PROCEDURE — 99239 HOSP IP/OBS DSCHRG MGMT >30: CPT | Performed by: HOSPITALIST

## 2019-08-23 RX ORDER — SPIRONOLACTONE 25 MG/1
25 TABLET ORAL DAILY
Qty: 30 TAB | Refills: 3 | Status: SHIPPED | OUTPATIENT
Start: 2019-08-24 | End: 2020-10-16

## 2019-08-23 RX ORDER — LISINOPRIL 5 MG/1
5 TABLET ORAL EVERY 12 HOURS
Qty: 30 TAB | Refills: 0 | Status: SHIPPED | OUTPATIENT
Start: 2019-08-23 | End: 2021-01-20 | Stop reason: SDUPTHER

## 2019-08-23 RX ORDER — ATORVASTATIN CALCIUM 80 MG/1
80 TABLET, FILM COATED ORAL EVERY EVENING
Qty: 30 TAB | Refills: 0 | Status: SHIPPED | OUTPATIENT
Start: 2019-08-23

## 2019-08-23 RX ORDER — METOPROLOL SUCCINATE 25 MG/1
25 TABLET, EXTENDED RELEASE ORAL DAILY
Qty: 30 TAB | Refills: 0 | Status: SHIPPED | OUTPATIENT
Start: 2019-08-24 | End: 2021-01-20 | Stop reason: SDUPTHER

## 2019-08-23 RX ORDER — ASPIRIN 81 MG/1
81 TABLET ORAL DAILY
Qty: 30 TAB | Refills: 0 | Status: SHIPPED | OUTPATIENT
Start: 2019-08-24

## 2019-08-23 RX ADMIN — TICAGRELOR 90 MG: 90 TABLET ORAL at 06:00

## 2019-08-23 RX ADMIN — SENNOSIDES, DOCUSATE SODIUM 2 TABLET: 50; 8.6 TABLET, FILM COATED ORAL at 05:09

## 2019-08-23 RX ADMIN — SPIRONOLACTONE 25 MG: 25 TABLET ORAL at 05:10

## 2019-08-23 RX ADMIN — TRAMADOL HYDROCHLORIDE 50 MG: 50 TABLET, FILM COATED ORAL at 05:15

## 2019-08-23 RX ADMIN — METOPROLOL SUCCINATE 25 MG: 25 TABLET, EXTENDED RELEASE ORAL at 05:09

## 2019-08-23 RX ADMIN — LISINOPRIL 5 MG: 10 TABLET ORAL at 05:10

## 2019-08-23 RX ADMIN — BUPRENORPHINE HYDROCHLORIDE AND NALOXONE HYDROCHLORIDE DIHYDRATE 1 TABLET: 8; 2 TABLET SUBLINGUAL at 05:09

## 2019-08-23 RX ADMIN — ASPIRIN 81 MG: 81 TABLET, COATED ORAL at 05:09

## 2019-08-23 ASSESSMENT — ENCOUNTER SYMPTOMS
WHEEZING: 0
CHEST TIGHTNESS: 0
DIZZINESS: 0
CHILLS: 0
VOMITING: 0
NAUSEA: 0
SHORTNESS OF BREATH: 0
COUGH: 0
PALPITATIONS: 0
HEADACHES: 0
FEVER: 0

## 2019-08-23 NOTE — CARE PLAN
Problem: Communication  Goal: The ability to communicate needs accurately and effectively will improve  Outcome: PROGRESSING AS EXPECTED  Note:   Patient educated on medications, procedures and use of call light. Patient will continue to verbalize and improve on education and communication in the plan of care.        Problem: Safety  Goal: Will remain free from injury  Outcome: PROGRESSING AS EXPECTED  Note:   Educated patient on use of call light, no slip socks on, bed lowest position. All needs attended to. Patient verbalized understanding.

## 2019-08-23 NOTE — PROGRESS NOTES
2 RN skin check complete.   Devices in place Glasses.  Skin assessed under devices red but blanching on Rt ear.  Confirmed pressure ulcers found on NA.  New potential pressure ulcers noted on NA. Wound consult placed NA.  The following interventions in place informed patient to take glasses off when sleeping and gray foams for glasses.    Patient's generalized skin is intact and Rt ear is red but blanching.

## 2019-08-23 NOTE — PROGRESS NOTES
Report received at bedside, patient care assumed. Tele box on. Patient resting in bed and denies CP/SOB, updated on POC, no requests at this time. Fall precautions in place with bed in lowest position, treaded socks on, and call light within reach.

## 2019-08-23 NOTE — PROGRESS NOTES
Cardiology Follow Up Progress Note    Date of Service  8/23/2019    Attending Physician  JAUN Godinez.*    Chief Complaint   Anterior STEMI    HPI  Tan Moore is a 64 y.o. male admitted 8/21/2019 with HTN, smoking, admitted with anterior STEMI, status post primary PCI    Interim Events  8/23/2019: Pt ambulating in the halls. Has no CP, palpitations, orthopnea, or palpitations.  Patient had cardiac catheterization 8/21/2019 with thrombectomy, angioplasty, and stent placement to proximal LAD with 2.5 x 20 mm Synergy OSCAR and angioplasty and stent placement to mid LAD with 2.25 x 28 mm Synergy drug-eluting stent.    Telemetry: SB 50s  Labs: Na 139, K 4.1, BUN 21, Cr 0.98, GFR > 60  , , HDL 34, LDL 91    Review of Systems  Review of Systems   Constitutional: Negative for chills and fever.   Respiratory: Negative for cough, chest tightness, shortness of breath and wheezing.    Cardiovascular: Negative for chest pain, palpitations and leg swelling.   Gastrointestinal: Negative for nausea and vomiting.   Neurological: Negative for dizziness and headaches.   All other systems reviewed and are negative.      Vital signs in last 24 hours  Temp:  [36.2 °C (97.2 °F)-37 °C (98.6 °F)] 36.6 °C (97.9 °F)  Pulse:  [60-63] 62  Resp:  [16-18] 16  BP: ()/(50-80) 109/80  SpO2:  [97 %-99 %] 99 %    Physical Exam  Physical Exam   Constitutional: He appears well-developed and well-nourished.   Eyes: EOM are normal.   Neck: Neck supple. No JVD present.   Cardiovascular: Normal rate, regular rhythm and normal heart sounds.   No murmur heard.  Pulmonary/Chest: Effort normal and breath sounds normal.   Abdominal: Soft. Bowel sounds are normal.   Neurological:   CN II-XII intact   Skin: Skin is warm and dry.   Psychiatric: He has a normal mood and affect. His behavior is normal. Judgment and thought content normal.   Nursing note and vitals reviewed.      Lab Review  Lab Results   Component Value Date/Time     WBC 7.4 08/23/2019 04:00 AM    RBC 4.65 (L) 08/23/2019 04:00 AM    HEMOGLOBIN 14.0 08/23/2019 04:00 AM    HEMATOCRIT 43.1 08/23/2019 04:00 AM    MCV 92.7 08/23/2019 04:00 AM    MCH 30.1 08/23/2019 04:00 AM    MCHC 32.5 (L) 08/23/2019 04:00 AM    MPV 9.7 08/23/2019 04:00 AM      Lab Results   Component Value Date/Time    SODIUM 138 08/23/2019 04:00 AM    POTASSIUM 4.0 08/23/2019 04:00 AM    CHLORIDE 108 08/23/2019 04:00 AM    CO2 21 08/23/2019 04:00 AM    GLUCOSE 99 08/23/2019 04:00 AM    BUN 17 08/23/2019 04:00 AM    CREATININE 0.91 08/23/2019 04:00 AM      Lab Results   Component Value Date/Time    ASTSGOT 124 (H) 08/22/2019 04:42 AM    ALTSGPT 34 08/22/2019 04:42 AM     Lab Results   Component Value Date/Time    CHOLSTRLTOT 147 08/22/2019 04:42 AM    LDL 91 08/22/2019 04:42 AM    HDL 34 (A) 08/22/2019 04:42 AM    TRIGLYCERIDE 111 08/22/2019 04:42 AM    TROPONINT 3347 (H) 08/22/2019 04:42 AM       Recent Labs     08/21/19  0644   NTPROBNP 211*       Cardiac Imaging and Procedures Review  Cath 8/21/2019  POSTPROCEDURE DIAGNOSES:  1.  Coronary artery disease with occluded proximal and mid left anterior   descending artery.  2.  Successful thrombectomy/percutaneous transluminal coronary   angioplasty/stent placement of the proximal left anterior descending artery   with 2.5x20 mm Synergy drug-eluting stent.  3.  Successful percutaneous transluminal coronary angioplasty/stent placement   of the mid left anterior descending artery with 2.25x28 mm Synergy   drug-eluting stent.  4.  Reduced left ventricular systolic function with ejection fraction of 30%.  5.  Elevated left ventricular end-diastolic pressure.        ECHO: 8/22/2019  CONCLUSIONS  No prior study is available for comparison.   Left ventricular ejection fraction is visually estimated to be 55%.  Normal inferior vena cava size and inspiratory collapse.  No evidence of valvular abnormality based on Doppler evaluation.       Impressions:    Assessment/Plan  1.  CAD  - Anterior STEMI  - Status post 2.5 x 20 mm Synergy OSCAR to pLAD and 2.25 x 28 mm Synergy OSCAR to mLAD  -Continue ASA 81 mg, atorvastatin 80 mg every evening, lisinopril 10 mg half tablet twice daily, metoprolol SR 25 mg daily, and Brilinta 90 mg twice daily  -Chest pain-free    2.  HTN  -Continue lisinopril, metoprolol      Thank you for allowing me to participate in the care of this patient.  Cardiology will sign off.    Please contact me with any questions.    JANES ZamoraPLEIGHANN.   Cardiologist, [facility]  [phone]

## 2019-08-23 NOTE — PROGRESS NOTES
Patient discharged AOx4 following thorough DC teaching. Paper scripts [facility] to [facility]. AVS provided to patient. Stent info packet provided to patient. Brilinta delivered by beds to meds and teaching provided. Patient verbalized understanding of provided teaching and acknowledged that all questions had been answered.

## 2019-08-23 NOTE — PROGRESS NOTES
"64-year-old male patient with history of hypertension, smoking admitted with anterior ST elevation MI, status post primary PCI.    Interval History:  Feels better this morning.  No arrhythmias on the monitor.  Denies chest pain, shortness of breath.  Ambulating well without any symptoms.    Physical Exam   Blood pressure 109/80, pulse 62, temperature 36.6 °C (97.9 °F), temperature source Temporal, resp. rate 16, height 1.753 m (5' 9\"), weight 72 kg (158 lb 11.7 oz), SpO2 99 %.    Constitutional:  Appears well-developed.   HENT: Normocephalic and atraumatic. No scleral icterus.   Neck: No JVD present.   Cardiovascular: Normal rate. Exam reveals no gallop and no friction rub. No murmur heard.   Pulmonary/Chest: CTAB   Abdominal: S/NT/ND BS+   Musculoskeletal: Exhibits no edema. Pulses present.  Skin: Skin is warm and dry.     ROS: As HPI other reviewed and negative       Intake/Output Summary (Last 24 hours) at 8/23/2019 1211  Last data filed at 8/23/2019 0800  Gross per 24 hour   Intake 780 ml   Output 900 ml   Net -120 ml       Recent Labs     08/21/19  0644 08/22/19  0442 08/23/19  0400   WBC 7.6 10.2 7.4   RBC 4.48* 4.30* 4.65*   HEMOGLOBIN 14.2 13.4* 14.0   HEMATOCRIT 43.3 40.8* 43.1   MCV 96.7 94.9 92.7   MCH 31.7 31.2 30.1   MCHC 32.8* 32.8* 32.5*   RDW 47.7 46.5 45.1   PLATELETCT 202 206 208   MPV 9.7 9.9 9.7     Recent Labs     08/21/19  0644 08/22/19  0442 08/23/19  0400   SODIUM 139 136 138   POTASSIUM 4.1 4.1 4.0   CHLORIDE 105 106 108   CO2 23 24 21   GLUCOSE 112* 102* 99   BUN 21 15 17   CREATININE 0.98 0.82 0.91   CALCIUM 9.4 9.2 8.9     Recent Labs     08/21/19  0644   APTT 29.9   INR 0.95             Recent Labs     08/22/19  0442   TRIGLYCERIDE 111   HDL 34*   LDL 91       No current facility-administered medications on file prior to encounter.      Current Outpatient Medications on File Prior to Encounter   Medication Sig Dispense Refill   • buprenorphine-naloxone (SUBOXONE) 8-2 MG SL Tab Place 1 " Tab under tongue 2 Times a Day. Pain     • amitriptyline (ELAVIL) 50 MG Tab Take 50 mg by mouth every evening.         Current Facility-Administered Medications   Medication Dose Frequency Provider Last Rate Last Dose   • metoprolol SR (TOPROL XL) tablet 25 mg  25 mg Q DAY Anurag Andrew M.D.   25 mg at 08/23/19 0509   • spironolactone (ALDACTONE) tablet 25 mg  25 mg Q DAY Anurag Andrew M.D.   25 mg at 08/23/19 0510   • amitriptyline (ELAVIL) tablet 25 mg  25 mg Nightly Newton Espinoza M.D.   25 mg at 08/22/19 1954   • senna-docusate (PERICOLACE or SENOKOT S) 8.6-50 MG per tablet 2 Tab  2 Tab BID Newton Espinoza M.D.   2 Tab at 08/23/19 0509    And   • polyethylene glycol/lytes (MIRALAX) PACKET 1 Packet  1 Packet QDAY PRN Newton Espinoza M.D.        And   • magnesium hydroxide (MILK OF MAGNESIA) suspension 30 mL  30 mL QDAY PRN Newton Espinoza M.D.        And   • bisacodyl (DULCOLAX) suppository 10 mg  10 mg QDAY PRN Newton Espinoza M.D.       • acetaminophen (TYLENOL) tablet 650 mg  650 mg Q6HRS PRN Newton Espinoza M.D.       • atorvastatin (LIPITOR) tablet 80 mg  80 mg Q EVENING Newton Espinoza M.D.   80 mg at 08/22/19 1729   • nitroglycerin (NITROSTAT) tablet 0.4 mg  0.4 mg Q5 MIN PRN Newton Espinoza M.D.       • ondansetron (ZOFRAN) syringe/vial injection 4 mg  4 mg Q4HRS PRN Newton Espinoza M.D.       • ondansetron (ZOFRAN ODT) dispertab 4 mg  4 mg Q4HRS PRN Newton Espinoza M.D.       • promethazine (PHENERGAN) tablet 12.5-25 mg  12.5-25 mg Q4HRS PRN Newton K Alexis, M.D.       • promethazine (PHENERGAN) suppository 12.5-25 mg  12.5-25 mg Q4HRS PRN Newton Espinoza M.D.       • prochlorperazine (COMPAZINE) injection 5-10 mg  5-10 mg Q4HRS PRN Newton Espinoza M.D.       • aspirin EC (ECOTRIN) tablet 81 mg  81 mg DAILY Omari Carlisle M.D.   81 mg at 08/23/19 0509   • ticagrelor (BRILINTA) tablet 90 mg  90 mg BID Omari Carlisle M.D.   90 mg at 08/23/19 0600   • lisinopril (PRINIVIL) 10 MG tablet 5 mg  5 mg Q12HRS Omari Carlisle M.D.   5 mg at 08/23/19  0510   • buprenorphine-naloxone (SUBOXONE) 8-2 MG sublingual tab 1 Tab  1 Tab BID Newton Espinoza M.D.   1 Tab at 08/23/19 0509   • tramadol (ULTRAM) 50 MG tablet 50 mg  50 mg Q8HRS PRN Dillon Nugent M.D.   50 mg at 08/23/19 0515     Last reviewed on 8/21/2019  8:12 AM by Carrie Ferguson PhT  Medications reviewed    Imaging reviewed    Cath 8/21/2019  POSTPROCEDURE DIAGNOSES:  1.  Coronary artery disease with occluded proximal and mid left anterior   descending artery.  2.  Successful thrombectomy/percutaneous transluminal coronary   angioplasty/stent placement of the proximal left anterior descending artery   with 2.5x20 mm Synergy drug-eluting stent.  3.  Successful percutaneous transluminal coronary angioplasty/stent placement   of the mid left anterior descending artery with 2.25x28 mm Synergy   drug-eluting stent.  4.  Reduced left ventricular systolic function with ejection fraction of 30%.  5.  Elevated left ventricular end-diastolic pressure.    ECHO:  No prior study is available for comparison.   Left ventricular ejection fraction is visually estimated to be 55%.  Normal inferior vena cava size and inspiratory collapse.  No evidence of valvular abnormality based on Doppler evaluation.      640 shows sinus rhythm with anterior wrist elevation.  Postprocedure EKG shows resolution of ST changes.  Impressions:  64-year-old male patient with history of hypertension, smoking admitted with anterior ST elevation MI.  High risk medication use.  Recommendations:  No residual obstructive coronary artery disease.  His chest pain is resolved.  No arrhythmias in the monitor.    Blood pressure well controlled.  Continue aspirin, atorvastatin, lisinopril, metoprolol succinate, Aldactone, Brilinta.  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  Reviewed discharge instructions medication compliance diet etc.  Okay to go  home today.    Discussed with primary physician and bedside nursing.    Do not hesitate to call me with questions regarding the patient care.    Anurag Andrew  Interventional cardiologist  Cell: [phone]

## 2019-08-23 NOTE — PROGRESS NOTES
Patient transferred to the floor, placed on tele box, and monitor room notified. Patient has complaints of back pain 7/10, and no indications of distress. Bed in the lowest position and call light and personal belongings within reach.

## 2019-08-23 NOTE — PROGRESS NOTES
Repeat echocardiogram shows EF is 55%. Patient has received no diuresis and Intensivist is still the only provider to have diagnosed HF based upon EF in cath lab prior to revascularization.      Dr. Mejia and Dr. Andrew: Will you please address whether or not it is appropriate for this patient to have HF on their problems List? Echo after revascularization shows EF of 55%. Intensivist had diagnosed HF and put it on the problems list because EF in cath lab prior to intervention was 30%.     Thank you to bedside RN for initiating meds to beds! At discharge, please use the ACS discharge checklist and have it co-signed by the charge RN before the patient leaves the hospital.  Thank you, Morelia, ESTER, CHFN [phone]   Last edited by Morelia Webb, RNinfaN. on 08/23/19 at 0711         Thank you, Morelia, Cardiovascular Nurse Navigator, RN, CHFN [phone]

## 2019-08-23 NOTE — THERAPY
"Physical Therapy Evaluation completed.   Bed Mobility:  Supine to Sit: Supervised  Transfers: Sit to Stand: Supervised  Gait: Level Of Assist: Supervised with No Equipment Needed       Plan of Care: Will benefit from Physical Therapy 2 times per week  Discharge Recommendations: Equipment: No Equipment Needed.     Pt presents s/p STEMI while lifting a trailer resulting in cardiac catheterization and stent of the LAD. Pt is moving very well at this time however demonstrates some impairment in activity tolerance indicated by RPE of 11 after ambulation. Pt demonstrates appropriate hemodynamic response to exercise; pt is highly motivated and reports highly active baseline functioning. Education was provided regarding return to activity parameters, monitoring signs of over exertion, and modifiable risk factors impacting inflammation with healing. Will follow for one more session for further cardiac rehab education given risk factors; antiicpate pt will be able to dc home.     See \"Rehab Therapy-Acute\" Patient Summary Report for complete documentation.     "

## 2019-08-23 NOTE — DISCHARGE INSTRUCTIONS
Discharge Instructions    Discharged to home by car with relative. Discharged via walking, hospital escort: Yes.  Special equipment needed: Not Applicable     Be sure to schedule a follow-up appointment with your primary care doctor or any specialists as instructed.     Discharge Plan:   Smoking Cessation Offered: Patient Counseled  Influenza Vaccine Indication: Indicated: Not available from distributor/    I understand that a diet low in cholesterol, fat, and sodium is recommended for good health. Unless I have been given specific instructions below for another diet, I accept this instruction as my diet prescription.   Other diet: Cardiac    Special Instructions: Diagnosis:  Acute Coronary Syndrome (ACS) is a diagnosis that encompasses cardiac-related chest pain and heart attack. ACS occurs when the blood flow to the heart muscle is severely reduced or cut off completely due to a slow process called atherosclerosis.  Atherosclerosis is a disease in which the coronary arteries become narrow from a buildup of fat, cholesterol, and other substances that combine to form plaque. If the plaque breaks, a blood clot will form and block the blood flow to the heart muscle. This lack of blood flow can cause damage or death to the heart muscle which is called a heart attack or Myocardial Infarction (MI). There are two different types of MIs:  ST Elevation Myocardial Infarction or STEMI (the most severe type of heart attack) and Non-ST Elevation Myocardial Infarction or NSTEMI.    Treatment Plan:  · Cardiac Diet  - Low fat, low salt, low cholesterol   · Cardiac Rehab  - Your doctor has ordered you a referral to Psychiatric Rehab.  Call [phone] to schedule an appointment.  · Attend my follow-up appointment with my Cardiologist.  · Take my medications as prescribed by my doctor  · Exercise daily  · Lower my bad cholesterol and raise my good cholesterol    Medications:  Certain medications are used to treat ACS.  Remember  to always take medications as prescribed and never stop talking medications unless told by your doctor.    You have been prescribed the following medicatons:    Aspirin - Aspirin is used as a blood thinning medication and you will require this medication indefinitely.  Anti-platelet/blood thinner - Your Anti-platelet/Blood thinning medication is called Brilinta, and is used in combination with aspirin to prevent clots from forming in your heart and/or around your stent.  Your doctor will determine how long you need to be on this medicine.  Beta-Blocker - Beta-Blocker Metoprolol is used to lower blood pressure and heart rate, and/or helps your heart heal after a heart attack.  Statin - Statin atorvastatin is used to lower cholesterol.    · Is patient discharged on Warfarin / Coumadin?   No     Depression / Suicide Risk    As you are discharged from this [facility] facility, it is important to learn how to keep safe from harming yourself.    Recognize the warning signs:  · Abrupt changes in personality, positive or negative- including increase in energy   · Giving away possessions  · Change in eating patterns- significant weight changes-  positive or negative  · Change in sleeping patterns- unable to sleep or sleeping all the time   · Unwillingness or inability to communicate  · Depression  · Unusual sadness, discouragement and loneliness  · Talk of wanting to die  · Neglect of personal appearance   · Rebelliousness- reckless behavior  · Withdrawal from people/activities they love  · Confusion- inability to concentrate     If you or a loved one observes any of these behaviors or has concerns about self-harm, here's what you can do:  · Talk about it- your feelings and reasons for harming yourself  · Remove any means that you might use to hurt yourself (examples: pills, rope, extension cords, firearm)  · Get professional help from the community (Mental Health, Substance Abuse, psychological counseling)  · Do not be  alone:Call your Safe Contact- someone whom you trust who will be there for you.  · Call your local CRISIS HOTLINE 672-9047 or 524-131-1222  · Call your local Children's Mobile Crisis Response Team [place] (224) 185-1065 or www.""  · Call the toll free National Suicide Prevention Hotlines   · National Suicide Prevention Lifeline 915-490-JYDA (9345)  · [employer] 800-SUICIDE (988-2989)      Acute Coronary Syndrome  Acute coronary syndrome (ACS) is a serious problem in which there is suddenly not enough blood and oxygen supplied to the heart. ACS may mean that one or more of the blood vessels in your heart (coronary arteries) may be blocked. ACS can result in chest pain or a heart attack (myocardial infarction or MI).  What are the causes?  This condition is caused by atherosclerosis, which is the buildup of fat and cholesterol (plaque) on the inside of the arteries. Over time, the plaque may narrow or block the artery, and this will lessen blood flow to the heart. Plaque can also become weak and break off within a coronary artery to form a clot and cause a sudden blockage.  What increases the risk?  The risk factors of this condition include:  · High cholesterol levels.  · High blood pressure (hypertension).  · Smoking.  · Diabetes.  · Age.  · Family history of chest pain, heart disease, or stroke.  · Lack of exercise.  What are the signs or symptoms?  The most common signs of this condition include:  · Chest pain, which can be:  ¨ A crushing or squeezing in the chest.  ¨ A tightness, pressure, fullness, or heaviness in the chest.  ¨ Present for more than a few minutes, or it can stop and recur.  · Pain in the arms, neck, jaw, or back.  · Unexplained heartburn or indigestion.  · Shortness of breath.  · Nausea.  · Sudden cold sweats.  · Feeling light-headed or dizzy.  Sometimes, this condition has no symptoms.  How is this diagnosed?  ACS may be diagnosed through the following  tests:  · Electrocardiogram (ECG).  · Blood tests.  · Coronary angiogram. This is a procedure to look at the coronary arteries to see if there is any blockage.  How is this treated?  Treatment for ACS may include:  · Healthy behavioral changes to reduce or control risk factors.  · Medicine.  · Coronary stenting. A stent helps to keep an artery open.  · Coronary angioplasty. This procedure widens a narrowed or blocked artery.  · Coronary artery bypass surgery. This will allow your blood to pass the blockage (bypass) to reach your heart.  Follow these instructions at home:  Eating and drinking  · Follow a heart-healthy diet. A dietitian can you help to educate you about healthy food options and changes.  · Use healthy cooking methods such as roasting, grilling, broiling, baking, poaching, steaming, or stir-frying. Talk to a dietitian to learn more about healthy cooking methods.  Medicines  · Take medicines only as directed by your health care provider.  · Do not take the following medicines unless your health care provider approves:  ¨ Nonsteroidal anti-inflammatory drugs (NSAIDs), such as ibuprofen, naproxen, or celecoxib.  ¨ Vitamin supplements that contain vitamin A, vitamin E, or both.  ¨ Hormone replacement therapy that contains estrogen with or without progestin.  · Stop illegal drug use.  Activity  · Follow an exercise program that is approved by your health care provider.  · Plan rest periods when you are fatigued.  Lifestyle  · Do not use any tobacco products, including cigarettes, chewing tobacco, or electronic cigarettes. If you need help quitting, ask your health care provider.  · If you drink alcohol, and your health care provider approves, limit your alcohol intake to no more than 1 drink per day. One drink equals 12 ounces of beer, 5 ounces of wine, or 1½ ounces of hard liquor.  · Learn to manage stress.  · Maintain a healthy weight. Lose weight as approved by your health care provider.  General  instructions  · Manage other health conditions, such as hypertension and diabetes, as directed by your health care provider.  · Keep all follow-up visits as directed by your health care provider. This is important.  · Your health care provider may ask you to monitor your blood pressure. A blood pressure reading consists of a higher number over a lower number, such as 110 over 72, written as 110/72. Ideally, your blood pressure should be:  ¨ Below 140/90 if you have no other medical conditions.  ¨ Below 130/80 if you have diabetes or kidney disease.  Get help right away if:  · You have pain in your chest, neck, arm, jaw, stomach, or back that lasts more than a few minutes, is recurring, or is not relieved by taking medicine under your tongue (sublingual nitroglycerin).  · You have profuse sweating without cause.  · You have unexplained:  ¨ Heartburn or indigestion.  ¨ Shortness of breath or difficulty breathing.  ¨ Nausea or vomiting.  ¨ Fatigue.  ¨ Feelings of nervousness or anxiety.  ¨ Weakness.  ¨ Diarrhea.  · You have sudden light-headedness or dizziness.  · You faint.  These symptoms may represent a serious problem that is an emergency. Do not wait to see if the symptoms will go away. Get medical help right away. Call your local emergency services (911 in the [place] Do not drive yourself to the clinic or hospital.   This information is not intended to replace advice given to you by your health care provider. Make sure you discuss any questions you have with your health care provider.  Document Released: 12/18/2006 Document Revised: 05/31/2017 Document Reviewed: 04/21/2015  Elsevier Interactive Patient Education © 2017 [employer]

## 2019-08-23 NOTE — CARE PLAN
Problem: Infection  Goal: Will remain free from infection  Outcome: PROGRESSING AS EXPECTED  Intervention: Assess signs and symptoms of infection  Note:   Patient does not have any signs or symptoms of an infection.     Problem: Bowel/Gastric:  Goal: Normal bowel function is maintained or improved  Outcome: PROGRESSING AS EXPECTED  Intervention: Educate patient and significant other/support system about signs and symptoms of constipation and interventions to implement  Note:   Patient has regular bowel movements and has no complaints of any constipation.

## 2019-08-24 NOTE — DISCHARGE SUMMARY
Discharge Summary    CHIEF COMPLAINT ON ADMISSION    STEMI    Reason for Admission  STEMI     Admission Date  8/21/2019    CODE STATUS  FULL CODE    HPI & HOSPITAL COURSE  Please see original H&P and consult note for specific information, patient admitted due to STEMI, patient was taken to cardiac cath, he is s/p PCI he is on brilinta and asa, patient had cardiomyopathy due to MI that recovered after revascularization, discussed with cardio and recommended to continue medical treatment and close f/u with cardio as outpatient, patient is pain free he has a normal echo no CHF, patient will be dc home today he will f/u with his pcp and cardio as outpatient, patient expressed understanding of his dc plan and agreed with it, all questions answered.         Therefore, he is discharged in good and stable condition to home with close outpatient follow-up.    The patient met 2-midnight criteria for an inpatient stay at the time of discharge.    Discharge Date  8/23/2019    FOLLOW UP ITEMS POST DISCHARGE  Cardio   PCP     DISCHARGE DIAGNOSES  Active Problems:    STEMI (ST elevation myocardial infarction) (HCC) POA: Yes    Hyperglycemia POA: Yes    Essential hypertension POA: Unknown    Nicotine abuse POA: Unknown    Chronic neck pain POA: Unknown  Resolved Problems:    * No resolved hospital problems. *      FOLLOW UP  Future Appointments   Date Time Provider Department Center   9/3/2019 12:45 PM REEMA Bellamy [facility] None     [facility]  [address]  [address]  [facility] [id number] [phone]  Call in 1 week  Your physician has referred you to Intensive Cardiac Rehab. You cannot begin ICR for 2 weeks to allow time for your heart to heal. If you do not hear from ICR in about 1 week, please call them to schedule. Thank you!    REEMA Carbajal ( [facility])   [address], [place] [address]  [phone]  Go on 8/26/2019  Arrive at 8 am for your Hospital  follow up and to obtain in a [facility]. You should also schedule with your PCP as soon as possible. Thank you       MEDICATIONS ON DISCHARGE     Medication List      START taking these medications      Instructions   aspirin 81 MG EC tablet  Start taking on:  8/24/2019   Take 1 Tab by mouth every day.  Dose:  81 mg     atorvastatin 80 MG tablet  Commonly known as:  LIPITOR   Take 1 Tab by mouth every evening.  Dose:  80 mg     lisinopril 5 MG Tabs  Commonly known as:  PRINIVIL   Take 1 Tab by mouth every 12 hours.  Dose:  5 mg     metoprolol SR 25 MG Tb24  Start taking on:  8/24/2019  Commonly known as:  TOPROL XL   Take 1 Tab by mouth every day.  Dose:  25 mg     spironolactone 25 MG Tabs  Start taking on:  8/24/2019  Commonly known as:  ALDACTONE   Take 1 Tab by mouth every day.  Dose:  25 mg     ticagrelor 90 MG Tabs tablet  Commonly known as:  BRILINTA   Doctor's comments:  MEDS TO BEDS PLEASE DELIVER ASAP  Take 1 Tab by mouth 2 Times a Day.  Dose:  90 mg        CONTINUE taking these medications      Instructions   amitriptyline 50 MG Tabs  Commonly known as:  ELAVIL   Take 50 mg by mouth every evening.  Dose:  50 mg     buprenorphine-naloxone 8-2 MG Subl  Commonly known as:  SUBOXONE   Place 1 Tab under tongue 2 Times a Day. Pain  Dose:  1 Tab        STOP taking these medications    metoprolol 25 MG Tabs  Commonly known as:  LOPRESSOR     tramadol 50 MG Tabs  Commonly known as:  ULTRAM            Allergies  No Known Allergies    DIET  Healthy diet    ACTIVITY  As tolerated.  Weight bearing as tolerated    CONSULTATIONS  cardio    PROCEDURES  Cardiac cath    LABORATORY  Lab Results   Component Value Date    SODIUM 138 08/23/2019    POTASSIUM 4.0 08/23/2019    CHLORIDE 108 08/23/2019    CO2 21 08/23/2019    GLUCOSE 99 08/23/2019    BUN 17 08/23/2019    CREATININE 0.91 08/23/2019        Lab Results   Component Value Date    WBC 7.4 08/23/2019    HEMOGLOBIN 14.0 08/23/2019    HEMATOCRIT 43.1  08/23/2019    PLATELETCT 208 08/23/2019        Total time of the discharge process exceeds 34 minutes.

## 2019-08-26 NOTE — PROGRESS NOTES
--update 08.26.19 0722: Per Dr. Vela's discharge summary, EF recovered after revascularization and NO CHF.    Thank you, Morelia, Cardiovascular Nurse Navigator, RN, CHFN [phone]

## 2020-09-12 ENCOUNTER — APPOINTMENT (OUTPATIENT)
Dept: RADIOLOGY | Facility: MEDICAL CENTER | Age: 65
DRG: 247 | End: 2020-09-12
Attending: EMERGENCY MEDICINE
Payer: MEDICARE

## 2020-09-12 ENCOUNTER — APPOINTMENT (OUTPATIENT)
Dept: CARDIOLOGY | Facility: MEDICAL CENTER | Age: 65
DRG: 247 | End: 2020-09-12
Attending: INTERNAL MEDICINE
Payer: MEDICARE

## 2020-09-12 ENCOUNTER — HOSPITAL ENCOUNTER (INPATIENT)
Facility: MEDICAL CENTER | Age: 65
LOS: 2 days | DRG: 247 | End: 2020-09-14
Attending: EMERGENCY MEDICINE | Admitting: INTERNAL MEDICINE
Payer: MEDICARE

## 2020-09-12 DIAGNOSIS — I21.02 ST ELEVATION MYOCARDIAL INFARCTION INVOLVING LEFT ANTERIOR DESCENDING (LAD) CORONARY ARTERY (HCC): ICD-10-CM

## 2020-09-12 PROBLEM — D64.9 NORMOCHROMIC ANEMIA: Status: ACTIVE | Noted: 2020-09-12

## 2020-09-12 PROBLEM — I25.2 H/O ST ELEVATION MYOCARDIAL INFARCTION: Status: ACTIVE | Noted: 2020-09-12

## 2020-09-12 PROBLEM — I95.9 HYPOTENSION: Status: ACTIVE | Noted: 2020-09-12

## 2020-09-12 PROBLEM — S82.891A ANKLE FRACTURE, RIGHT: Status: ACTIVE | Noted: 2020-09-12

## 2020-09-12 LAB
ALBUMIN SERPL BCP-MCNC: 2.7 G/DL (ref 3.2–4.9)
ALBUMIN/GLOB SERPL: 1.3 G/DL
ALP SERPL-CCNC: 72 U/L (ref 30–99)
ALT SERPL-CCNC: 15 U/L (ref 2–50)
ANION GAP SERPL CALC-SCNC: 13 MMOL/L (ref 7–16)
AST SERPL-CCNC: 9 U/L (ref 12–45)
BASOPHILS # BLD AUTO: 0.7 % (ref 0–1.8)
BASOPHILS # BLD: 0.05 K/UL (ref 0–0.12)
BILIRUB SERPL-MCNC: <0.2 MG/DL (ref 0.1–1.5)
BUN SERPL-MCNC: 18 MG/DL (ref 8–22)
CALCIUM SERPL-MCNC: 7.2 MG/DL (ref 8.5–10.5)
CHLORIDE SERPL-SCNC: 108 MMOL/L (ref 96–112)
CO2 SERPL-SCNC: 16 MMOL/L (ref 20–33)
COVID ORDER STATUS COVID19: NORMAL
CREAT SERPL-MCNC: 1 MG/DL (ref 0.5–1.4)
EKG IMPRESSION: NORMAL
EKG IMPRESSION: NORMAL
EOSINOPHIL # BLD AUTO: 0.22 K/UL (ref 0–0.51)
EOSINOPHIL NFR BLD: 3 % (ref 0–6.9)
ERYTHROCYTE [DISTWIDTH] IN BLOOD BY AUTOMATED COUNT: 44.4 FL (ref 35.9–50)
GLOBULIN SER CALC-MCNC: 2.1 G/DL (ref 1.9–3.5)
GLUCOSE SERPL-MCNC: 131 MG/DL (ref 65–99)
HCT VFR BLD AUTO: 25.6 % (ref 42–52)
HGB BLD-MCNC: 8.4 G/DL (ref 14–18)
IMM GRANULOCYTES # BLD AUTO: 0.03 K/UL (ref 0–0.11)
IMM GRANULOCYTES NFR BLD AUTO: 0.4 % (ref 0–0.9)
INR PPP: 1.03 (ref 0.87–1.13)
LYMPHOCYTES # BLD AUTO: 2.81 K/UL (ref 1–4.8)
LYMPHOCYTES NFR BLD: 38.5 % (ref 22–41)
MAGNESIUM SERPL-MCNC: 1.6 MG/DL (ref 1.5–2.5)
MCH RBC QN AUTO: 30.1 PG (ref 27–33)
MCHC RBC AUTO-ENTMCNC: 32.8 G/DL (ref 33.7–35.3)
MCV RBC AUTO: 91.8 FL (ref 81.4–97.8)
MONOCYTES # BLD AUTO: 0.78 K/UL (ref 0–0.85)
MONOCYTES NFR BLD AUTO: 10.7 % (ref 0–13.4)
NEUTROPHILS # BLD AUTO: 3.41 K/UL (ref 1.82–7.42)
NEUTROPHILS NFR BLD: 46.7 % (ref 44–72)
NRBC # BLD AUTO: 0 K/UL
NRBC BLD-RTO: 0 /100 WBC
PLATELET # BLD AUTO: 293 K/UL (ref 164–446)
PMV BLD AUTO: 10 FL (ref 9–12.9)
POTASSIUM SERPL-SCNC: 3.7 MMOL/L (ref 3.6–5.5)
PROT SERPL-MCNC: 4.8 G/DL (ref 6–8.2)
PROTHROMBIN TIME: 13.8 SEC (ref 12–14.6)
RBC # BLD AUTO: 2.79 M/UL (ref 4.7–6.1)
SARS-COV-2 RNA RESP QL NAA+PROBE: NOTDETECTED
SODIUM SERPL-SCNC: 137 MMOL/L (ref 135–145)
SPECIMEN SOURCE: NORMAL
TROPONIN T SERPL-MCNC: 17 NG/L (ref 6–19)
WBC # BLD AUTO: 7.3 K/UL (ref 4.8–10.8)

## 2020-09-12 PROCEDURE — 4A023N7 MEASUREMENT OF CARDIAC SAMPLING AND PRESSURE, LEFT HEART, PERCUTANEOUS APPROACH: ICD-10-PCS | Performed by: INTERNAL MEDICINE

## 2020-09-12 PROCEDURE — 027034Z DILATION OF CORONARY ARTERY, ONE ARTERY WITH DRUG-ELUTING INTRALUMINAL DEVICE, PERCUTANEOUS APPROACH: ICD-10-PCS | Performed by: INTERNAL MEDICINE

## 2020-09-12 PROCEDURE — B240ZZ3 ULTRASONOGRAPHY OF SINGLE CORONARY ARTERY, INTRAVASCULAR: ICD-10-PCS | Performed by: INTERNAL MEDICINE

## 2020-09-12 PROCEDURE — 700101 HCHG RX REV CODE 250

## 2020-09-12 PROCEDURE — A9270 NON-COVERED ITEM OR SERVICE: HCPCS

## 2020-09-12 PROCEDURE — 83735 ASSAY OF MAGNESIUM: CPT

## 2020-09-12 PROCEDURE — 85025 COMPLETE CBC W/AUTO DIFF WBC: CPT

## 2020-09-12 PROCEDURE — 93005 ELECTROCARDIOGRAM TRACING: CPT | Performed by: INTERNAL MEDICINE

## 2020-09-12 PROCEDURE — 92978 ENDOLUMINL IVUS OCT C 1ST: CPT | Mod: 26,LD | Performed by: INTERNAL MEDICINE

## 2020-09-12 PROCEDURE — 80053 COMPREHEN METABOLIC PANEL: CPT

## 2020-09-12 PROCEDURE — 770022 HCHG ROOM/CARE - ICU (200)

## 2020-09-12 PROCEDURE — B2111ZZ FLUOROSCOPY OF MULTIPLE CORONARY ARTERIES USING LOW OSMOLAR CONTRAST: ICD-10-PCS | Performed by: INTERNAL MEDICINE

## 2020-09-12 PROCEDURE — 85610 PROTHROMBIN TIME: CPT

## 2020-09-12 PROCEDURE — 84484 ASSAY OF TROPONIN QUANT: CPT

## 2020-09-12 PROCEDURE — A9270 NON-COVERED ITEM OR SERVICE: HCPCS | Performed by: INTERNAL MEDICINE

## 2020-09-12 PROCEDURE — 700102 HCHG RX REV CODE 250 W/ 637 OVERRIDE(OP): Performed by: INTERNAL MEDICINE

## 2020-09-12 PROCEDURE — 99152 MOD SED SAME PHYS/QHP 5/>YRS: CPT | Performed by: INTERNAL MEDICINE

## 2020-09-12 PROCEDURE — 93458 L HRT ARTERY/VENTRICLE ANGIO: CPT | Mod: 26,59 | Performed by: INTERNAL MEDICINE

## 2020-09-12 PROCEDURE — 71045 X-RAY EXAM CHEST 1 VIEW: CPT

## 2020-09-12 PROCEDURE — 700111 HCHG RX REV CODE 636 W/ 250 OVERRIDE (IP)

## 2020-09-12 PROCEDURE — 99291 CRITICAL CARE FIRST HOUR: CPT | Performed by: INTERNAL MEDICINE

## 2020-09-12 PROCEDURE — 700117 HCHG RX CONTRAST REV CODE 255: Performed by: INTERNAL MEDICINE

## 2020-09-12 PROCEDURE — 92978 ENDOLUMINL IVUS OCT C 1ST: CPT

## 2020-09-12 PROCEDURE — 92941 PRQ TRLML REVSC TOT OCCL AMI: CPT | Mod: LD | Performed by: INTERNAL MEDICINE

## 2020-09-12 PROCEDURE — 700102 HCHG RX REV CODE 250 W/ 637 OVERRIDE(OP)

## 2020-09-12 PROCEDURE — U0003 INFECTIOUS AGENT DETECTION BY NUCLEIC ACID (DNA OR RNA); SEVERE ACUTE RESPIRATORY SYNDROME CORONAVIRUS 2 (SARS-COV-2) (CORONAVIRUS DISEASE [COVID-19]), AMPLIFIED PROBE TECHNIQUE, MAKING USE OF HIGH THROUGHPUT TECHNOLOGIES AS DESCRIBED BY CMS-2020-01-R: HCPCS

## 2020-09-12 PROCEDURE — C9803 HOPD COVID-19 SPEC COLLECT: HCPCS | Performed by: INTERNAL MEDICINE

## 2020-09-12 PROCEDURE — 99291 CRITICAL CARE FIRST HOUR: CPT

## 2020-09-12 PROCEDURE — 700105 HCHG RX REV CODE 258: Performed by: INTERNAL MEDICINE

## 2020-09-12 PROCEDURE — 99255 IP/OBS CONSLTJ NEW/EST HI 80: CPT | Performed by: INTERNAL MEDICINE

## 2020-09-12 PROCEDURE — B2151ZZ FLUOROSCOPY OF LEFT HEART USING LOW OSMOLAR CONTRAST: ICD-10-PCS | Performed by: INTERNAL MEDICINE

## 2020-09-12 RX ORDER — EPTIFIBATIDE 2 MG/ML
INJECTION, SOLUTION INTRAVENOUS
Status: COMPLETED
Start: 2020-09-12 | End: 2020-09-12

## 2020-09-12 RX ORDER — HEPARIN SODIUM 200 [USP'U]/100ML
INJECTION, SOLUTION INTRAVENOUS
Status: COMPLETED
Start: 2020-09-12 | End: 2020-09-12

## 2020-09-12 RX ORDER — VERAPAMIL HYDROCHLORIDE 2.5 MG/ML
INJECTION, SOLUTION INTRAVENOUS
Status: COMPLETED
Start: 2020-09-12 | End: 2020-09-12

## 2020-09-12 RX ORDER — SODIUM CHLORIDE 9 MG/ML
INJECTION, SOLUTION INTRAVENOUS CONTINUOUS
Status: DISPENSED | OUTPATIENT
Start: 2020-09-12 | End: 2020-09-12

## 2020-09-12 RX ORDER — BISACODYL 10 MG
10 SUPPOSITORY, RECTAL RECTAL
Status: DISCONTINUED | OUTPATIENT
Start: 2020-09-12 | End: 2020-09-14 | Stop reason: HOSPADM

## 2020-09-12 RX ORDER — AMOXICILLIN 250 MG
2 CAPSULE ORAL 2 TIMES DAILY
Status: DISCONTINUED | OUTPATIENT
Start: 2020-09-12 | End: 2020-09-14 | Stop reason: HOSPADM

## 2020-09-12 RX ORDER — ONDANSETRON 4 MG/1
4 TABLET, ORALLY DISINTEGRATING ORAL EVERY 4 HOURS PRN
Status: DISCONTINUED | OUTPATIENT
Start: 2020-09-12 | End: 2020-09-14 | Stop reason: HOSPADM

## 2020-09-12 RX ORDER — BIVALIRUDIN 250 MG/5ML
INJECTION, POWDER, LYOPHILIZED, FOR SOLUTION INTRAVENOUS
Status: COMPLETED
Start: 2020-09-12 | End: 2020-09-12

## 2020-09-12 RX ORDER — ONDANSETRON 2 MG/ML
4 INJECTION INTRAMUSCULAR; INTRAVENOUS EVERY 4 HOURS PRN
Status: DISCONTINUED | OUTPATIENT
Start: 2020-09-12 | End: 2020-09-14 | Stop reason: HOSPADM

## 2020-09-12 RX ORDER — TRAMADOL HYDROCHLORIDE 50 MG/1
50 TABLET ORAL EVERY 6 HOURS PRN
Status: DISCONTINUED | OUTPATIENT
Start: 2020-09-12 | End: 2020-09-14 | Stop reason: HOSPADM

## 2020-09-12 RX ORDER — AMITRIPTYLINE HYDROCHLORIDE 50 MG/1
50 TABLET, FILM COATED ORAL NIGHTLY
Status: DISCONTINUED | OUTPATIENT
Start: 2020-09-12 | End: 2020-09-14 | Stop reason: HOSPADM

## 2020-09-12 RX ORDER — HEPARIN SODIUM,PORCINE 1000/ML
VIAL (ML) INJECTION
Status: COMPLETED
Start: 2020-09-12 | End: 2020-09-12

## 2020-09-12 RX ORDER — NOREPINEPHRINE BITARTRATE 0.03 MG/ML
0-30 INJECTION, SOLUTION INTRAVENOUS CONTINUOUS
Status: DISCONTINUED | OUTPATIENT
Start: 2020-09-12 | End: 2020-09-13

## 2020-09-12 RX ORDER — ATORVASTATIN CALCIUM 80 MG/1
80 TABLET, FILM COATED ORAL EVERY EVENING
Status: DISCONTINUED | OUTPATIENT
Start: 2020-09-12 | End: 2020-09-14 | Stop reason: HOSPADM

## 2020-09-12 RX ORDER — LIDOCAINE HYDROCHLORIDE 20 MG/ML
INJECTION, SOLUTION INFILTRATION; PERINEURAL
Status: COMPLETED
Start: 2020-09-12 | End: 2020-09-12

## 2020-09-12 RX ORDER — ACETAMINOPHEN 325 MG/1
650 TABLET ORAL EVERY 6 HOURS PRN
Status: DISCONTINUED | OUTPATIENT
Start: 2020-09-12 | End: 2020-09-14 | Stop reason: HOSPADM

## 2020-09-12 RX ORDER — ACETAMINOPHEN 325 MG/1
650 TABLET ORAL EVERY 6 HOURS PRN
Status: DISCONTINUED | OUTPATIENT
Start: 2020-09-12 | End: 2020-09-12

## 2020-09-12 RX ORDER — NOREPINEPHRINE BITARTRATE 1 MG/ML
INJECTION, SOLUTION INTRAVENOUS
Status: COMPLETED
Start: 2020-09-12 | End: 2020-09-12

## 2020-09-12 RX ORDER — POLYETHYLENE GLYCOL 3350 17 G/17G
1 POWDER, FOR SOLUTION ORAL
Status: DISCONTINUED | OUTPATIENT
Start: 2020-09-12 | End: 2020-09-14 | Stop reason: HOSPADM

## 2020-09-12 RX ORDER — MIDAZOLAM HYDROCHLORIDE 1 MG/ML
INJECTION INTRAMUSCULAR; INTRAVENOUS
Status: COMPLETED
Start: 2020-09-12 | End: 2020-09-12

## 2020-09-12 RX ORDER — AMIODARONE HYDROCHLORIDE 150 MG/3ML
INJECTION, SOLUTION INTRAVENOUS
Status: COMPLETED
Start: 2020-09-12 | End: 2020-09-12

## 2020-09-12 RX ADMIN — AMIODARONE HYDROCHLORIDE 150 MG: 50 INJECTION, SOLUTION INTRAVENOUS at 11:23

## 2020-09-12 RX ADMIN — TRAMADOL HYDROCHLORIDE 50 MG: 50 TABLET, FILM COATED ORAL at 20:49

## 2020-09-12 RX ADMIN — EPTIFIBATIDE 20000 MCG: 2 INJECTION, SOLUTION INTRAVENOUS at 11:24

## 2020-09-12 RX ADMIN — VERAPAMIL HYDROCHLORIDE 2.5 MG: 2.5 INJECTION, SOLUTION INTRAVENOUS at 10:53

## 2020-09-12 RX ADMIN — TRAMADOL HYDROCHLORIDE 50 MG: 50 TABLET, FILM COATED ORAL at 14:41

## 2020-09-12 RX ADMIN — SODIUM CHLORIDE: 9 INJECTION, SOLUTION INTRAVENOUS at 12:27

## 2020-09-12 RX ADMIN — MIDAZOLAM HYDROCHLORIDE 1 MG: 1 INJECTION, SOLUTION INTRAMUSCULAR; INTRAVENOUS at 11:30

## 2020-09-12 RX ADMIN — LIDOCAINE HYDROCHLORIDE: 20 INJECTION, SOLUTION INFILTRATION; PERINEURAL at 10:53

## 2020-09-12 RX ADMIN — HEPARIN SODIUM 2000 UNITS: 200 INJECTION, SOLUTION INTRAVENOUS at 10:53

## 2020-09-12 RX ADMIN — EPTIFIBATIDE 20000 MCG: 2 INJECTION, SOLUTION INTRAVENOUS at 11:28

## 2020-09-12 RX ADMIN — NOREPINEPHRINE BITARTRATE 8 MG: 1 INJECTION INTRAVENOUS at 11:25

## 2020-09-12 RX ADMIN — ATORVASTATIN CALCIUM 80 MG: 20 TABLET, FILM COATED ORAL at 17:18

## 2020-09-12 RX ADMIN — IOHEXOL 140 ML: 350 INJECTION, SOLUTION INTRAVENOUS at 11:30

## 2020-09-12 RX ADMIN — HEPARIN SODIUM: 1000 INJECTION, SOLUTION INTRAVENOUS; SUBCUTANEOUS at 10:53

## 2020-09-12 RX ADMIN — AMITRIPTYLINE HYDROCHLORIDE 50 MG: 50 TABLET, FILM COATED ORAL at 20:00

## 2020-09-12 RX ADMIN — BIVALIRUDIN 250 MG: 250 INJECTION, POWDER, LYOPHILIZED, FOR SOLUTION INTRAVENOUS at 11:03

## 2020-09-12 RX ADMIN — TICAGRELOR 180 MG: 90 TABLET ORAL at 11:37

## 2020-09-12 RX ADMIN — FENTANYL CITRATE 25 MCG: 50 INJECTION INTRAMUSCULAR; INTRAVENOUS at 11:30

## 2020-09-12 RX ADMIN — NITROGLYCERIN 10 ML: 20 INJECTION INTRAVENOUS at 10:53

## 2020-09-12 ASSESSMENT — LIFESTYLE VARIABLES
ALCOHOL_USE: NO
DOES PATIENT WANT TO STOP DRINKING: NO
ON A TYPICAL DAY WHEN YOU DRINK ALCOHOL HOW MANY DRINKS DO YOU HAVE: 0
TOTAL SCORE: 0
HAVE YOU EVER FELT YOU SHOULD CUT DOWN ON YOUR DRINKING: NO
EVER HAD A DRINK FIRST THING IN THE MORNING TO STEADY YOUR NERVES TO GET RID OF A HANGOVER: NO
SUBSTANCE_ABUSE: 0
EVER HAD A DRINK FIRST THING IN THE MORNING TO STEADY YOUR NERVES TO GET RID OF A HANGOVER: NO
CONSUMPTION TOTAL: NEGATIVE
HAVE PEOPLE ANNOYED YOU BY CRITICIZING YOUR DRINKING: NO
ALCOHOL_USE: NO
CONSUMPTION TOTAL: INCOMPLETE
DOES PATIENT WANT TO STOP DRINKING: NO
TOTAL SCORE: 0
TOTAL SCORE: 0
EVER FELT BAD OR GUILTY ABOUT YOUR DRINKING: NO
HAVE PEOPLE ANNOYED YOU BY CRITICIZING YOUR DRINKING: NO
EVER FELT BAD OR GUILTY ABOUT YOUR DRINKING: NO
HOW MANY TIMES IN THE PAST YEAR HAVE YOU HAD 5 OR MORE DRINKS IN A DAY: 0
HAVE YOU EVER FELT YOU SHOULD CUT DOWN ON YOUR DRINKING: NO
TOTAL SCORE: 0
TOTAL SCORE: 0
AVERAGE NUMBER OF DAYS PER WEEK YOU HAVE A DRINK CONTAINING ALCOHOL: 0
TOTAL SCORE: 0

## 2020-09-12 ASSESSMENT — FIBROSIS 4 INDEX
FIB4 SCORE: 0.52

## 2020-09-12 ASSESSMENT — COGNITIVE AND FUNCTIONAL STATUS - GENERAL
DAILY ACTIVITIY SCORE: 24
SUGGESTED CMS G CODE MODIFIER MOBILITY: CJ
MOVING FROM LYING ON BACK TO SITTING ON SIDE OF FLAT BED: A LITTLE
CLIMB 3 TO 5 STEPS WITH RAILING: A LITTLE
SUGGESTED CMS G CODE MODIFIER DAILY ACTIVITY: CH
STANDING UP FROM CHAIR USING ARMS: A LITTLE
WALKING IN HOSPITAL ROOM: A LITTLE
MOBILITY SCORE: 20

## 2020-09-12 ASSESSMENT — ENCOUNTER SYMPTOMS
SHORTNESS OF BREATH: 1
BLOOD IN STOOL: 0
ORTHOPNEA: 0
SPEECH CHANGE: 0
NAUSEA: 0
EYES NEGATIVE: 1
HEADACHES: 0
SENSORY CHANGE: 0
SORE THROAT: 0
PND: 0
CHILLS: 0
ABDOMINAL PAIN: 0
DIARRHEA: 0
COUGH: 0
VOMITING: 0
FOCAL WEAKNESS: 0
SPUTUM PRODUCTION: 0
PALPITATIONS: 0
BACK PAIN: 0
FEVER: 0
BRUISES/BLEEDS EASILY: 0

## 2020-09-12 ASSESSMENT — COPD QUESTIONNAIRES
DURING THE PAST 4 WEEKS HOW MUCH DID YOU FEEL SHORT OF BREATH: NONE/LITTLE OF THE TIME
HAVE YOU SMOKED AT LEAST 100 CIGARETTES IN YOUR ENTIRE LIFE: YES
COPD SCREENING SCORE: 4
DO YOU EVER COUGH UP ANY MUCUS OR PHLEGM?: NO/ONLY WITH OCCASIONAL COLDS OR INFECTIONS

## 2020-09-12 ASSESSMENT — PAIN DESCRIPTION - PAIN TYPE: TYPE: CHRONIC PAIN

## 2020-09-12 ASSESSMENT — PATIENT HEALTH QUESTIONNAIRE - PHQ9
SUM OF ALL RESPONSES TO PHQ9 QUESTIONS 1 AND 2: 0
2. FEELING DOWN, DEPRESSED, IRRITABLE, OR HOPELESS: NOT AT ALL
1. LITTLE INTEREST OR PLEASURE IN DOING THINGS: NOT AT ALL

## 2020-09-12 NOTE — ASSESSMENT & PLAN NOTE
Currently hypotensive post Cath Lab on low-dose norepinephrine  Hold home antihypertensive regimen  Clinically appropriate beta-blocker/ACE inhibitor regimen for BP/post MI per cardiology

## 2020-09-12 NOTE — ASSESSMENT & PLAN NOTE
No history diabetes  Blood sugar only mildly elevated  SSI/hypoglycemia protocols as clinically appropriate  Pain A1c if blood sugar remains elevated/trends up

## 2020-09-12 NOTE — CONSULTS
Critical Care Consultation    Date of consult: 9/12/2020    Referring Physician  Palomo Robb M.D.;R*    Reason for Consultation  STEMI w/hypotension in cath lab    History of Presenting Illness  65 y.o. male with a past medical history of CAD with STEMI 8/2019 status post stent, hypertension and recent ankle fracture for which she has been casted more than a month who presented 9/12/2020 with chest pain 10 out of 10 x 1 hour that substernal and similar to the chest pain he had about a year ago with his myocardial infarction.  He has associated shortness of breath but no nausea or vomiting.  He denied palpitations.  Code STEMI was activated and cardiology took the patient to the Cath Lab and found 100% obstructed LAD stent with thrombus.  EF was 30%.  He had successful PCI of LAD with an Liborio OSCAR.  Patient did have some transient A. fib prior to opening up the artery.  Chest pain completely resolved with reperfusion.  Patient had hypotension in the Cath Lab and was started on norepinephrine which is weaning nicely and down to only 1 deborah.  Patient seen in both the Cath Lab and the [facility].    Code Status  Full Code    Review of Systems  Review of Systems   Constitutional: Negative for chills, fever and malaise/fatigue.   HENT: Negative for congestion and sore throat.    Eyes: Negative.    Respiratory: Positive for shortness of breath. Negative for cough and sputum production.    Cardiovascular: Positive for chest pain. Negative for palpitations, orthopnea, leg swelling and PND.   Gastrointestinal: Negative for abdominal pain, blood in stool, diarrhea, nausea and vomiting.   Genitourinary: Negative.    Musculoskeletal: Negative for back pain.   Neurological: Negative for sensory change, speech change, focal weakness and headaches.   Endo/Heme/Allergies: Does not bruise/bleed easily.   Psychiatric/Behavioral: Negative for substance abuse.       Past Medical History   has a past medical history of Anxiety, Arthritis,  Bilateral knee pain, Chronic low back pain, Chronic neck pain, Depression, and Hypertension.  History of CAD status post STEMI 8/2019 status post stenting LAD then    Surgical History   has a past surgical history that includes arthroplasty and laminotomy (1988).    Family History  family history is not on file.    Social History   reports that he has been smoking cigarettes. He has a 46.00 pack-year smoking history. He has never used smokeless tobacco. He reports that he does not drink alcohol.    Medications  Home Medications    **Home medications have not yet been reviewed for this encounter**       Current Facility-Administered Medications   Medication Dose Route Frequency Provider Last Rate Last Dose   • NS infusion   Intravenous Continuous Jean Weiner M.D. 125 mL/hr at 09/12/20 1227     • [START ON 9/13/2020] aspirin EC (ECOTRIN) tablet 81 mg  81 mg Oral DAILY Jean Weiner M.D.       • acetaminophen (TYLENOL) tablet 650 mg  650 mg Oral Q6HRS PRN Jean Weiner M.D.       • [START ON 9/13/2020] ticagrelor (BRILINTA) tablet 90 mg  90 mg Oral BID Jean Weiner M.D.       • bivalirudin (ANGIOMAX) 250 mg in NS 50 mL Infusion  0.2 mg/kg/hr Intravenous Continuous Jean Weiner M.D. 2.6 mL/hr at 09/12/20 1215 0.2 mg/kg/hr at 09/12/20 1215   • amitriptyline (ELAVIL) tablet 50 mg  50 mg Oral Nightly Jean Gagnon M.D.       • atorvastatin (LIPITOR) tablet 80 mg  80 mg Oral Q EVENING Jean Gagnon M.D.       • senna-docusate (PERICOLACE or SENOKOT S) 8.6-50 MG per tablet 2 Tab  2 Tab Oral BID Jean Gagnon M.D.        And   • polyethylene glycol/lytes (MIRALAX) PACKET 1 Packet  1 Packet Oral QDAY PRN Jean Gagnon M.D.        And   • magnesium hydroxide (MILK OF MAGNESIA) suspension 30 mL  30 mL Oral QDAY PRN Jean Gagnon M.D.        And   • bisacodyl (DULCOLAX) suppository 10 mg  10 mg Rectal QDAY PRN Jean Gagnon M.D.       • Respiratory Therapy Consult    Nebulization Continuous RT Jean Gagnon M.D.       • enoxaparin (LOVENOX) inj 40 mg  40 mg Subcutaneous DAILY Jean Gagnon M.D.   Stopped at 09/12/20 1215   • ondansetron (ZOFRAN) syringe/vial injection 4 mg  4 mg Intravenous Q4HRS PRN Jean Gagnon M.D.       • ondansetron (ZOFRAN ODT) dispertab 4 mg  4 mg Oral Q4HRS PRN Jean Gagnon M.D.       • norepinephrine (Levophed) infusion 8 mg/250 mL (premix)  0-30 mcg/min Intravenous Continuous Jean Gagnon M.D. 0.9 mL/hr at 09/12/20 1330 0.5 mcg/min at 09/12/20 1330   • tramadol (ULTRAM) 50 MG tablet 50 mg  50 mg Oral Q6HRS PRN Jean Gagnon M.D.           Allergies  No Known Allergies    Vital Signs last 24 hours  Temp:  [35.8 °C (96.4 °F)] 35.8 °C (96.4 °F)  Pulse:  [52-88] 52  Resp:  [12-40] 19  BP: ()/(36-86) 86/49  SpO2:  [93 %-97 %] 97 %    Physical Exam  Physical Exam  Vitals signs reviewed.   Constitutional:       Appearance: Normal appearance. He is normal weight. He is ill-appearing. He is not toxic-appearing or diaphoretic.      Interventions: Nasal cannula in place.   HENT:      Head: Normocephalic and atraumatic.      Mouth/Throat:      Mouth: Mucous membranes are moist.   Eyes:      General: No scleral icterus.     Extraocular Movements: Extraocular movements intact.      Pupils: Pupils are equal, round, and reactive to light.   Neck:      Musculoskeletal: Neck supple.   Cardiovascular:      Rate and Rhythm: Normal rate and regular rhythm.      Pulses: Normal pulses.      Heart sounds: No murmur. No friction rub. No gallop.       Comments: Sinus rhythm  Right wrist compression device post right radial artery cardiac catheterization  Pulmonary:      Effort: No respiratory distress.      Breath sounds: No wheezing, rhonchi or rales.   Abdominal:      General: Abdomen is flat. There is no distension.      Palpations: Abdomen is soft. There is no mass.      Tenderness: There is no abdominal tenderness. There is no right  CVA tenderness, left CVA tenderness or guarding.   Musculoskeletal:      Right lower leg: No edema.      Left lower leg: No edema.      Comments: Right lower extremity cast   Skin:     General: Skin is warm and dry.      Capillary Refill: Capillary refill takes less than 2 seconds.      Coloration: Skin is not cyanotic.      Nails: There is no clubbing.     Neurological:      General: No focal deficit present.      Mental Status: He is alert and oriented to person, place, and time. Mental status is at baseline.      GCS: GCS eye subscore is 4. GCS verbal subscore is 5. GCS motor subscore is 6.      Comments: Follows well, in good spirits now that chest pain has resolved   Psychiatric:         Behavior: Behavior normal. Behavior is cooperative.         Thought Content: Thought content normal.         Fluids    Intake/Output Summary (Last 24 hours) at 2020 1429  Last data filed at 2020 1200  Gross per 24 hour   Intake --   Output 0 ml   Net 0 ml       Laboratory  Recent Results (from the past 48 hour(s))   EKG STAT    Collection Time: 20 10:04 AM   Result Value Ref Range    Report       [facility] Emergency Dept.    Test Date:  2020  Pt Name:    MARIBEL SCHOFIELD              Department: 161  MRN:        [id number]                      Room:       T619  Gender:     Male                         Technician: [id number]  :        1955                   Requested By:LISA BATRES  Order #:    [id number]                    Reading MD:    Measurements  Intervals                                Axis  Rate:       59                           P:          82  CO:         180                          QRS:        -27  QRSD:       102                          T:          78  QT:         444  QTc:        440    Interpretive Statements  SINUS BRADYCARDIA  BORDERLINE LEFT AXIS DEVIATION  BORDERLINE R WAVE PROGRESSION, ANTERIOR LEADS  Compared to ECG 2019 14:53:38  ST (T wave) deviation  no longer present     CBC w/ Differential    Collection Time: 09/12/20 10:10 AM   Result Value Ref Range    WBC 7.3 4.8 - 10.8 K/uL    RBC 2.79 (L) 4.70 - 6.10 M/uL    Hemoglobin 8.4 (L) 14.0 - 18.0 g/dL    Hematocrit 25.6 (L) 42.0 - 52.0 %    MCV 91.8 81.4 - 97.8 fL    MCH 30.1 27.0 - 33.0 pg    MCHC 32.8 (L) 33.7 - 35.3 g/dL    RDW 44.4 35.9 - 50.0 fL    Platelet Count 293 164 - 446 K/uL    MPV 10.0 9.0 - 12.9 fL    Neutrophils-Polys 46.70 44.00 - 72.00 %    Lymphocytes 38.50 22.00 - 41.00 %    Monocytes 10.70 0.00 - 13.40 %    Eosinophils 3.00 0.00 - 6.90 %    Basophils 0.70 0.00 - 1.80 %    Immature Granulocytes 0.40 0.00 - 0.90 %    Nucleated RBC 0.00 /100 WBC    Neutrophils (Absolute) 3.41 1.82 - 7.42 K/uL    Lymphs (Absolute) 2.81 1.00 - 4.80 K/uL    Monos (Absolute) 0.78 0.00 - 0.85 K/uL    Eos (Absolute) 0.22 0.00 - 0.51 K/uL    Baso (Absolute) 0.05 0.00 - 0.12 K/uL    Immature Granulocytes (abs) 0.03 0.00 - 0.11 K/uL    NRBC (Absolute) 0.00 K/uL   Complete Metabolic Panel (CMP)    Collection Time: 09/12/20 10:10 AM   Result Value Ref Range    Sodium 137 135 - 145 mmol/L    Potassium 3.7 3.6 - 5.5 mmol/L    Chloride 108 96 - 112 mmol/L    Co2 16 (L) 20 - 33 mmol/L    Anion Gap 13.0 7.0 - 16.0    Glucose 131 (H) 65 - 99 mg/dL    Bun 18 8 - 22 mg/dL    Creatinine 1.00 0.50 - 1.40 mg/dL    Calcium 7.2 (L) 8.5 - 10.5 mg/dL    AST(SGOT) 9 (L) 12 - 45 U/L    ALT(SGPT) 15 2 - 50 U/L    Alkaline Phosphatase 72 30 - 99 U/L    Total Bilirubin <0.2 0.1 - 1.5 mg/dL    Albumin 2.7 (L) 3.2 - 4.9 g/dL    Total Protein 4.8 (L) 6.0 - 8.2 g/dL    Globulin 2.1 1.9 - 3.5 g/dL    A-G Ratio 1.3 g/dL   Troponin STAT    Collection Time: 09/12/20 10:10 AM   Result Value Ref Range    Troponin T 17 6 - 19 ng/L   PT/INR    Collection Time: 09/12/20 10:10 AM   Result Value Ref Range    PT 13.8 12.0 - 14.6 sec    INR 1.03 0.87 - 1.13   ESTIMATED GFR    Collection Time: 09/12/20 10:10 AM   Result Value Ref Range    GFR If   American >60 >60 mL/min/1.73 m 2    GFR If Non African American >60 >60 mL/min/1.73 m 2   Magnesium    Collection Time: 20 10:10 AM   Result Value Ref Range    Magnesium 1.6 1.5 - 2.5 mg/dL   EKG    Collection Time: 20 10:13 AM   Result Value Ref Range    Report       [facility].    Test Date:  2020  Pt Name:    MARIBEL SCHOFIELD              Department: 161  MRN:        [id number]                      Room:       T619  Gender:     Male                         Technician: [id number]  :        1955                   Requested By:LISA BATRES  Order #:    [id number]                    Reading MD:    Measurements  Intervals                                Axis  Rate:       66                           P:          73  CO:         192                          QRS:        -79  QRSD:       158                          T:          75  QT:         476  QTc:        499    Interpretive Statements  SINUS TACHYCARDIA  VENTRICULAR PREMATURE COMPLEX  INTERPOLATED VENTRICULAR PREMATURE COMPLEX  RIGHT BUNDLE BRANCH BLOCK  Compared to ECG 2020 10:04:12  Ventricular premature complex(es) now present  Right bundle-branch block now present  Sinus bradycardia no longer present         Imaging  DX-CHEST-PORTABLE (1 VIEW)   Final Result         No acute cardiac or pulmonary abnormality is identified.      CL-LEFT HEART CATHETERIZATION WITH POSSIBLE INTERVENTION    (Results Pending)       Assessment/Plan  * STEMI (ST elevation myocardial infarction) (HCC)- (present on admission)  Assessment & Plan  Presented to ER with 1 hour of 10/10 substernal chest pain similar to pain he had with his first STEMI 2019  Aspirin administered in the ER  LAD thrombosis in the old stent opened in the Cath Lab and OSCAR stent placed  Integrilin bolus and Cath Lab  Brilinta  Echocardiogram per cardiology  Risk factor modification-smoking cessation  ACE/statin/beta-blocker when clinically  appropriate    Hypotension  Assessment & Plan  Hypotension secondary to STEMI  EF 30% pre-opening LAD thrombosis  Norepinephrine actively being titrated  Appears euvolemic clinically  Holding antihypertensive regimen    H/O ST elevation myocardial infarction  Assessment & Plan  History of LAD STEMI 8/2019 status post stenting    Normochromic anemia  Assessment & Plan  Persisting normochromic anemia  Unclear etiology, negative ROS  Hemoccult stools  Transfuse keep hemoglobin greater than 8 in the setting of STEMI      Nicotine abuse- (present on admission)  Assessment & Plan  Smoking cessation strongly encouraged  46-pack-year history of smoking, denies COPD symptoms  Chest x-ray clear, O2 saturations good  Flu shot to be encouraged    Essential hypertension- (present on admission)  Assessment & Plan  Currently hypotensive post Cath Lab on low-dose norepinephrine  Hold home antihypertensive regimen  Clinically appropriate beta-blocker/ACE inhibitor regimen for BP/post MI per cardiology    Ankle fracture, right  Assessment & Plan  Recent right ankle fracture requiring ORIF >1 month ago  No clinical signs or symptoms to suggest DVT or PE  DVT Prophylaxis      Chronic neck pain- (present on admission)  Assessment & Plan  Heat, stretching, NSAIDs as needed-resume home regimen as clinically appropriate    Hyperglycemia- (present on admission)  Assessment & Plan  No history diabetes  Blood sugar only mildly elevated  SSI/hypoglycemia protocols as clinically appropriate  A1c    Hemorrhoids- (present on admission)  Assessment & Plan  History hemorrhoids, no complaints today  Monitor for bleeding      Discussed patient condition and risk of morbidity and/or mortality with RN, RT, Pharmacy, Patient and cardiology.    The patient remains critically ill.  Critical care time = 45 minutes in directly providing and coordinating critical care and extensive data review.  No time overlap and excludes procedures.

## 2020-09-12 NOTE — ED PROVIDER NOTES
ED Provider Note    CHIEF COMPLAINT  No chief complaint on file.      HPI  Tan Moore is a 65 y.o. male who presents as a code STEMI.  The patient has a history of myocardial infarction approximately 1 year ago.  Today he was at home doing no strenuous activity approximately 45 minutes prior to my evaluation when he had acute onset of chest pain.  He describes it as a burning and tightness that radiates to his left upper extremity.  He states this feels exactly like his previous heart attack.  He did have associated shortness of breath and nausea but no vomiting.  He denies any diaphoresis.  He is brought in by ambulance.  He is received aspirin prehospital but no nitroglycerin because he had taken 1 of his own just before medics arrival.  He states he had no burning under his tongue when he took the nitroglycerin and thinks it might be old.  He rates the pain an 8 on a scale of 10.  Cardiac risk factors are positive for tobacco, negative for diabetes, positive for hypertension, negative for cholesterol and a positive personal history of coronary artery disease.  Prior to onset of symptoms he states he has been feeling fine.  He said no fevers or chills.  He said no cough or sputum production.  He has no history of DVT or PE.  Coincidentally he does have a cast on his right ankle from a fracture.  He is not on any blood thinners.    REVIEW OF SYSTEMS  See HPI for further details. All other systems negative.    PAST MEDICAL HISTORY  Past Medical History:   Diagnosis Date   • Anxiety    • Arthritis    • Bilateral knee pain     CHRONIC   • Chronic low back pain    • Chronic neck pain    • Depression    • Hypertension        FAMILY HISTORY  No family history on file.    SOCIAL HISTORY  Social History     Socioeconomic History   • Marital status: Single     Spouse name: Not on file   • Number of children: Not on file   • Years of education: Not on file   • Highest education level: Not on file   Occupational History    • Not on file   Social Needs   • Financial resource strain: Not on file   • Food insecurity     Worry: Not on file     Inability: Not on file   • Transportation needs     Medical: Not on file     Non-medical: Not on file   Tobacco Use   • Smoking status: Current Every Day Smoker     Packs/day: 1.00     Years: 46.00     Pack years: 46.00     Types: Cigarettes   • Smokeless tobacco: Never Used   Substance and Sexual Activity   • Alcohol use: Never     Frequency: Never   • Drug use: Not on file   • Sexual activity: Not on file   Lifestyle   • Physical activity     Days per week: Not on file     Minutes per session: Not on file   • Stress: Not on file   Relationships   • Social connections     Talks on phone: Not on file     Gets together: Not on file     Attends Jain service: Not on file     Active member of club or organization: Not on file     Attends meetings of clubs or organizations: Not on file     Relationship status: Not on file   • Intimate partner violence     Fear of current or ex partner: Not on file     Emotionally abused: Not on file     Physically abused: Not on file     Forced sexual activity: Not on file   Other Topics Concern   • Not on file   Social History Narrative   • Not on file       SURGICAL HISTORY  Past Surgical History:   Procedure Laterality Date   • LAMINOTOMY  1988    L4-L5   • ATHROPLASTY      b/l knee surgery       CURRENT MEDICATIONS  Home Medications    **Home medications have not yet been reviewed for this encounter**         ALLERGIES  No Known Allergies    PHYSICAL EXAM  VITAL SIGNS: Reviewed.    Constitutional: Well developed, Well nourished, significant distress, Non-toxic appearance.   HENT: Normocephalic, Atraumatic.  Eyes:  EOMI, Conjunctiva normal, No discharge.   Cardiovascular: Normal heart rate, Normal rhythm, No murmurs, No rubs, No gallops.   Thorax & Lungs: Clear to auscultation without wheezes, rales, or rhonchi. No chest tenderness.   Abdomen: Bowel sounds  normal, Soft, No tenderness, No masses, No pulsatile masses.   Skin: Warm, Dry.  Extremities: No edema, no calf tenderness.  Right short leg cast.    Musculoskeletal: Good range of motion in all major joints.  Neurologic: Awake and alert, No focal deficits noted.        EKG  EKG #1  Sinus rhythm with no acute ischemic changes  EKG #2  New right bundle branch block    RADIOLOGY/PROCEDURES  DX-CHEST-PORTABLE (1 VIEW)    (Results Pending)   CL-LEFT HEART CATHETERIZATION WITH POSSIBLE INTERVENTION    (Results Pending)         COURSE & MEDICAL DECISION MAKING  Pertinent Labs & Imaging studies reviewed. (See chart for details)  This is a 65-year-old brought in by ambulance from home as a code STEMI.  I reviewed his EKG in the field which suggest some slight anterior elevation that does not meet criteria for STEMI.  The patient's history and symptoms were very concerning.  EKG on arrival shows a sinus rhythm with no acute ischemic changes.  I texted a photograph of the EKG to Dr. Naqvi who is on-call for cardiology.  He called back and stated that it appeared that there was no acute ST changes and that the Cath Lab could be called off.  He requested laboratories and echo be ordered and that he would come in and see the patient.  I explained to him that I was very concerned about the patient's symptoms and presentation as well as his history.  I informed him that I was going to get an EKG after the patient received some morphine for pain control.  His blood pressure had been marginal and he had been bolused with normal saline.  Repeat EKG shows a new right bundle branch block.  I took a photograph of this EKG and sent it to Dr. Naqvi.  He is come in and evaluated the patient and has activated the Cath Lab and the patient is transferred directly to the Cath Lab for intervention.  He was able to receive 1 sublingual nitroglycerin here in the emergency department.  At the time of transfer out of the emergency department he was  still having crushing chest pain.  I did receive a text from  from the Cath Lab stating that the patient had a 100% proximal LAD occlusion.  The patient would be admitted to the [facility] therefore I have contacted the intensivist and we have discussed the case.  He will be the primary admitting physician.    FINAL IMPRESSION  1.  Acute non-STEMI myocardial infarction  2.  100% occlusion of proximal LAD  3.  Hypotension  4.  Anemia  5.  Patient required 35 minutes of critical care time         Electronically signed by: Palomo Robb M.D., 9/12/2020 10:32 AM

## 2020-09-12 NOTE — ASSESSMENT & PLAN NOTE
Hypotension secondary to STEMI, resolved  EF 30% pre-opening LAD thrombosis  Norepinephrine actively being titrated, weaned off within a few hours of arriving to [facility]  Appears euvolemic clinically  Holding antihypertensive regimen

## 2020-09-12 NOTE — ASSESSMENT & PLAN NOTE
Presented to ER with 1 hour of 10/10 substernal chest pain similar to pain he had with his first STEMI 8/2019  Aspirin administered in the ER  LAD thrombosis in the old stent opened in the Cath Lab and OSCAR stent placed  Integrilin bolus and Cath Lab  Brilinta  Echocardiogram per cardiology  Risk factor modification-smoking cessation  ACE/statin/beta-blocker when clinically appropriate  Hemodynamically better, weaned off norepinephrine just a few hours after Cath Lab  No chest pain today

## 2020-09-12 NOTE — ASSESSMENT & PLAN NOTE
Recent right ankle fracture requiring ORIF >1 month ago  No clinical signs or symptoms to suggest DVT or PE  DVT Prophylaxis

## 2020-09-12 NOTE — DISCHARGE PLANNING
Medical Social Work    MSW responded to STEMI. Pt was BIB [facility] from home. Pt lives alone. Pt stated he would like his sister Sapphire called. Pt stated her contact information is in the chart. MSW tried calling [employer] ([phone]). Number stated was disconnected. MSW will remain available if pt would like further assistance getting in touch with family.

## 2020-09-12 NOTE — CARE PLAN
Problem: Safety  Goal: Will remain free from injury  Outcome: PROGRESSING AS EXPECTED  Goal: Will remain free from falls  Outcome: PROGRESSING AS EXPECTED     Problem: Knowledge Deficit  Goal: Knowledge of the prescribed therapeutic regimen will improve  Outcome: PROGRESSING AS EXPECTED

## 2020-09-12 NOTE — CONSULTS
Cardiology Initial Consult Note    DOS: 9/12/2020    Referring physician: Dr Robb    Chief complaint/Reason for consult: Chest pain    HPI: 66 y/o M with HTN, HLD, CAD s/p STEMI in 08/2019. Presents with 1 hour of chest pain. Acute onset. Not associated with any activity. Sharp in nature. Substernal. Increases with deep inspiration. Constant, 10/10 pain. Feels similar to prior MI. Claims compliance with medication, thinks he was taken of [place] recently. No palpitations. + SOB. No syncope. He has been in a cast due to ankle fracture for 1 month.    EMS called, activated for concern for ST elevation. In ED, serial ECGs note NSR on initial ECG, Bifascicular block with ST changes on repeat.    ROS (+ highlighted in bold):  Constitutional: Fevers/chills/fatigue/weightloss  HEENT: Blurry vision/eye pain/sore throat/hearing loss  Respiratory: Shortness of breath/cough  Cardiovascular: Chest pain/palpitations/edema/orthopnea/syncope  GI: Nausea/vomitting/diarrhea  MSK: Arthralgias/myagias/muscle weakness  Skin: Rash/sores  Neurological: Numbness/tremors/vertigo  Endocrine: Excessive thirst/polyuria/cold intolerance/heat intolerance  Psych: Depression/anxiety    Past Medical History:   Diagnosis Date   • Anxiety    • Arthritis    • Bilateral knee pain     CHRONIC   • Chronic low back pain    • Chronic neck pain    • Depression    • Hypertension        Past Surgical History:   Procedure Laterality Date   • LAMINOTOMY  1988    L4-L5   • ATHROPLASTY      b/l knee surgery       Social History     Socioeconomic History   • Marital status: Single     Spouse name: Not on file   • Number of children: Not on file   • Years of education: Not on file   • Highest education level: Not on file   Occupational History   • Not on file   Social Needs   • Financial resource strain: Not on file   • Food insecurity     Worry: Not on file     Inability: Not on file   • Transportation needs     Medical: Not on file     Non-medical: Not on file    Tobacco Use   • Smoking status: Current Every Day Smoker     Packs/day: 1.00     Years: 46.00     Pack years: 46.00     Types: Cigarettes   • Smokeless tobacco: Never Used   Substance and Sexual Activity   • Alcohol use: Never     Frequency: Never   • Drug use: Not on file   • Sexual activity: Not on file   Lifestyle   • Physical activity     Days per week: Not on file     Minutes per session: Not on file   • Stress: Not on file   Relationships   • Social connections     Talks on phone: Not on file     Gets together: Not on file     Attends [employer]an service: Not on file     Active member of club or organization: Not on file     Attends meetings of clubs or organizations: Not on file     Relationship status: Not on file   • Intimate partner violence     Fear of current or ex partner: Not on file     Emotionally abused: Not on file     Physically abused: Not on file     Forced sexual activity: Not on file   Other Topics Concern   • Not on file   Social History Narrative   • Not on file       No family history on file.   Denies family history of SCD    No Known Allergies    Current Facility-Administered Medications   Medication Dose Route Frequency Provider Last Rate Last Dose   • LIDOCAINE HCL 2 % INJ SOLN            • HEPARIN 1000 UNITS/ML OR USE ONLY            • VERAPAMIL HCL 2.5 MG/ML IV SOLN            • HEPARIN (PORCINE) IN NACL 2000-0.9 UNIT/L-% IV SOLN            • NITROGLYCERIN 2 MG IV SOLN            • MIDAZOLAM HCL 2 MG/2ML INJ SOLN (WRAPPED)            • FENTANYL CITRATE (PF) 0.05 MG/ML INJ SOLN (WRAPPED)              Current Outpatient Medications   Medication Sig Dispense Refill   • ticagrelor (BRILINTA) 90 MG Tab tablet Take 1 Tab by mouth 2 Times a Day. 60 Tab 1   • aspirin EC 81 MG EC tablet Take 1 Tab by mouth every day. 30 Tab 0   • atorvastatin (LIPITOR) 80 MG tablet Take 1 Tab by mouth every evening. 30 Tab 0   • lisinopril (PRINIVIL) 5 MG Tab Take 1 Tab by mouth every 12 hours. 30 Tab 0   •  metoprolol SR (TOPROL XL) 25 MG TABLET SR 24 HR Take 1 Tab by mouth every day. 30 Tab 0   • spironolactone (ALDACTONE) 25 MG Tab Take 1 Tab by mouth every day. 30 Tab 3   • buprenorphine-naloxone (SUBOXONE) 8-2 MG SL Tab Place 1 Tab under tongue 2 Times a Day. Pain     • amitriptyline (ELAVIL) 50 MG Tab Take 50 mg by mouth every evening.         Physical Exam:    SBP 87/57 mmhg  HR 85 bpm    General appearance: Uncomfortable, conversant   Eyes: anicteric sclerae, moist conjunctivae; no lid-lag; PERRLA  HENT: Atraumatic; oropharynx clear with moist mucous membranes and no mucosal ulcerations; normal hard and soft palate  Neck: Trachea midline; FROM, supple, no thyromegaly or lymphadenopathy  Lungs: CTA, with normal respiratory effort and no intercostal retractions  CV: RRR, no MRGs, no JVD   Abdomen: Soft, non-tender; no masses or HSM  Extremities: No peripheral edema or extremity lymphadenopathy  Skin: Normal temperature, turgor and texture; no rash, ulcers or subcutaneous nodules  Psych:  alert and oriented to person, place and time    Data:  Lipids:   Lab Results   Component Value Date/Time    CHOLSTRLTOT 147 08/22/2019 04:42 AM    TRIGLYCERIDE 111 08/22/2019 04:42 AM    HDL 34 (A) 08/22/2019 04:42 AM    LDL 91 08/22/2019 04:42 AM        BMP:  Lab Results   Component Value Date/Time    SODIUM 138 08/23/2019 0400    POTASSIUM 4.0 08/23/2019 0400    CHLORIDE 108 08/23/2019 0400    CO2 21 08/23/2019 0400    GLUCOSE 99 08/23/2019 0400    BUN 17 08/23/2019 0400    CREATININE 0.91 08/23/2019 0400    CALCIUM 8.9 08/23/2019 0400    ANION 9.0 08/23/2019 0400        TSH:   Lab Results   Component Value Date/Time    TSHULTRASEN 1.660 05/30/2009 0857        THYROXINE (T4):   No results found for: ERIKA     CBC:   Lab Results   Component Value Date/Time    WBC 7.4 08/23/2019 04:00 AM    RBC 4.65 (L) 08/23/2019 04:00 AM    HEMOGLOBIN 14.0 08/23/2019 04:00 AM    HEMATOCRIT 43.1 08/23/2019 04:00 AM    MCV 92.7 08/23/2019 04:00  AM    MCH 30.1 08/23/2019 04:00 AM    MCHC 32.5 (L) 08/23/2019 04:00 AM    RDW 45.1 08/23/2019 04:00 AM    PLATELETCT 208 08/23/2019 04:00 AM    MPV 9.7 08/23/2019 04:00 AM    NEUTSPOLYS 62.70 08/23/2019 04:00 AM    LYMPHOCYTES 24.50 08/23/2019 04:00 AM    MONOCYTES 10.20 08/23/2019 04:00 AM    EOSINOPHILS 1.80 08/23/2019 04:00 AM    BASOPHILS 0.40 08/23/2019 04:00 AM    IMMGRAN 0.40 08/23/2019 04:00 AM    NRBC 0.00 08/23/2019 04:00 AM    NEUTS 4.61 08/23/2019 04:00 AM    LYMPHS 1.80 08/23/2019 04:00 AM    MONOS 0.75 08/23/2019 04:00 AM    EOS 0.13 08/23/2019 04:00 AM    BASO 0.03 08/23/2019 04:00 AM    IMMGRANAB 0.03 08/23/2019 04:00 AM    NRBCAB 0.00 08/23/2019 04:00 AM        CBC w/o DIFF  Lab Results   Component Value Date/Time    WBC 7.4 08/23/2019 04:00 AM    RBC 4.65 (L) 08/23/2019 04:00 AM    HEMOGLOBIN 14.0 08/23/2019 04:00 AM    MCV 92.7 08/23/2019 04:00 AM    MCH 30.1 08/23/2019 04:00 AM    MCHC 32.5 (L) 08/23/2019 04:00 AM    RDW 45.1 08/23/2019 04:00 AM    MPV 9.7 08/23/2019 04:00 AM       Prior echo/stress results reviewed: Prior LV function 55%    Prior cath results reviewed: PCI to LAD in 08/2019    EKG interpreted by me: NSR    Repeat ECG with bifascicular block, ST-TW changes anteriorly    Impression/Plan:  1) Chest pain, acute  2) CAD s/p STEMI    - Due to clinical appearance and history of CAD s/p STEMI, will proceed to [facility] for presumed unstable angina, possible MI  - If negative, consider workup for PE  - Admit to hospitalist    Thank you for this consult, cardiology will follow    Chato Naqvi MD  Cardiac Electrophysiology

## 2020-09-12 NOTE — ASSESSMENT & PLAN NOTE
Persisting normochromic anemia  Unclear etiology, negative ROS  Hemoccult stools pending  Transfuse keep hemoglobin greater than 8 in the setting of STEMI  Iron studies noted, does have follow-up appointment next week with primary physician continue evaluation, patient is encouraged to follow-up with him in that regard

## 2020-09-12 NOTE — PROCEDURES
REFERRING PHYSICIAN: Dr. Naqvi    PREOPERATIVE DIAGNOSIS:  1.  Acute STEMI  2.  Anemia, chronic  3.  Recent ankle fracture  4.  Recent discontinuation of Brilinta (ticagrelor)    POSTOPERATIVE DIAGNOSIS:  1.  100% thrombotically occluded proximal LAD stent  2.  Mild nonobstructive CAD otherwise  3.  LVEF 30% prior to intervention  4.  Successful IVIU guided PCI culprit LAD (3.0 x 34 mm Liborio OSCAR), excellent result      PROCEDURE PERFORMED:  Selective coronary angiography of the native vessels  Left heart catheterization  Left ventriculogram  PCI of LAD OSCAR  Intravascular sound LAD  Supervision moderate sedation    DESCRIPTION OF PROCEDURE:  The risks and benefits of cardiac catheterization as well as the procedure itself, rationale and appropriateness were discussed with the patient today. Complications including but not limited to death, stroke, MI, urgent bypass surgery, contrast nephropathy, vascular complications, bleeding and infection were explained to the patient. The potential outcomes associated with the procedure (possible PCI, possible CABG, possible medical Rx only) were also discussed at length. The patient agreed to proceed.    The patient was transported to the catheterization laboratory in the emergency state. The right radial area and right groin were prepped and draped in the usual fashion. Right radial area was entered with a single through and through puncture under direct ultrasound guidance and a 6F glide sheath was placed. An intra-arterial cocktail of heparin, verapamil and nitroglycerine was administered. Over a wire, a 5F Tayla catheter was passed to the aortic root and used to engage the right and left coronaries without difficulty. Contrast was administered and multiple images obtained. This catheter was then used to cross the aortic valve for LHC and contrast was administered at 8cc/s for 24cc's for left ventriculogram.     DESCRIPTION OF PCI:  The decision was made to intervene on the  culprit coronary artery.  Bivalirudin bolus infusion was initiated.  The diagnostic catheter was exchanged over a wire for an 6 [place] EBU 3.5 guide seated appropriately. A BMW 0.014 floppy tip wire was navigated across the culprit stenosis. A 2.25 mm balloon was used to predilate the lesion, followed by a 2.5 mm balloon..  There was restoration of flow with thrombotic occlusion of the very apical LAD branch less than 1 mm in diameter.  This was wired and a manual aspiration catheter was advanced with manual aspiration performed.  Integrilin double bolus without infusion was administered.  As the patient was hypotensive norepinephrine low-dose was initiated to stabilize his hemodynamics during the procedure.  Subsequently the intravascular ultrasound catheter was advanced after ringdown was performed distal to the occluded stented segment and a manual pullback was recorded.  This revealed stent undersizing and an uncovered segment of atherosclerosis.  A 3.0 x 34 mm Liborio OSCAR was then positioned and deployed at nominal pressure. Following this a 3.0 noncompliant balloon was used to post dilate the stent to high pressures. There was an excellent angiographic result with COLE-3 flow and no residual stenosis in the stented segment. All catheters and guidewires were removed and a TR band was applied to achieve patent hemostasis. Patient left the cath lab in stable condition.      Moderate sedation directly monitored by me during the case while supervising the administration of the sedation medication by an independent trained RN to assist in the monitoring of the patient's level of consciousness and physiological status. I, the supervising physician was present the entire time from beginning of medication administration until the end of the procedure from 10:47 AM until 11:27 AM. For detailed administration records please see the moderate sedation documentation in the median tab.      FINDINGS:  I. HEMODYNAMICS:    Ao:  87/55 mmHg   LEDP: 23 mmHg   Gradient on LV pullback: No    II. LEFT VENTRICULOGRAM:   LVEF KNIGHT PROJECTION: 30 %     III. CORONARY ANGIOGRAPHY:  Left Main: Large long vessel bifurcating no CAD.  Left Anterior Descendin% thrombotically occluded at the proximal edge of a previously placed stent.  Postintervention there is 0% residual stenosis in the stented segment and improvement of flow from COLE 0 to COLE III.  There is a tiny apical thrombotic occlusion which is resolving at the end of the procedure with mechanical thrombectomy, medical therapy and mechanical dottering.  Left Circumflex: Moderate to large caliber vessel supplying a large obtuse marginal.  There is a 50% ostial stenosis.  No other significant lesions.  Right Coronary: Moderate to large caliber vessel with diffuse nonobstructive moderate CAD in the RPDA.    COMPLICATIONS: none apparent    CONCLUSIONS:  1.  100% thrombotically occluded proximal LAD stent  2.  Mild nonobstructive CAD otherwise  3.  LVEF 30% prior to intervention  4.  Successful IVIU guided PCI culprit LAD (3.0 x 34 mm Liborio OSCAR), excellent result    RECOMMENDATIONS:  Lifelong dual antiplatelet therapy as tolerated  Aggressive medical therapy for secondary prevention of CAD and therapy directed at his underlying ischemic cardiomyopathy

## 2020-09-12 NOTE — ASSESSMENT & PLAN NOTE
Smoking cessation strongly encouraged again  46-pack-year history of smoking, denies COPD symptoms  Chest x-ray clear, O2 saturations good  Flu shot to be encouraged, they should be available in the next few weeks

## 2020-09-12 NOTE — PROGRESS NOTES
1200- Pt arrived to T619 via hospital bed with Cath lab team. Pt connected to monitor, levophed gtt at 4 mcg/min and angiomax at 0.2mg/kg/hr. Pt alert and oriented, no complaints of pain. TR band to Right wrist, CDI. POC discussed, py verbalized understanding. Close monitoring in progress.

## 2020-09-13 ENCOUNTER — APPOINTMENT (OUTPATIENT)
Dept: CARDIOLOGY | Facility: MEDICAL CENTER | Age: 65
DRG: 247 | End: 2020-09-13
Attending: INTERNAL MEDICINE
Payer: MEDICARE

## 2020-09-13 LAB
ANION GAP SERPL CALC-SCNC: 8 MMOL/L (ref 7–16)
BUN SERPL-MCNC: 15 MG/DL (ref 8–22)
CALCIUM SERPL-MCNC: 7.6 MG/DL (ref 8.5–10.5)
CHLORIDE SERPL-SCNC: 103 MMOL/L (ref 96–112)
CO2 SERPL-SCNC: 20 MMOL/L (ref 20–33)
CREAT SERPL-MCNC: 1.02 MG/DL (ref 0.5–1.4)
EKG IMPRESSION: NORMAL
ERYTHROCYTE [DISTWIDTH] IN BLOOD BY AUTOMATED COUNT: 46.5 FL (ref 35.9–50)
FERRITIN SERPL-MCNC: 64.1 NG/ML (ref 22–322)
GLUCOSE SERPL-MCNC: 80 MG/DL (ref 65–99)
HCT VFR BLD AUTO: 25.2 % (ref 42–52)
HGB BLD-MCNC: 8.1 G/DL (ref 14–18)
IRON SATN MFR SERPL: 16 % (ref 15–55)
IRON SERPL-MCNC: 39 UG/DL (ref 50–180)
LV EJECT FRACT  99904: 60
LV EJECT FRACT MOD 2C 99903: 60.66
LV EJECT FRACT MOD 4C 99902: 66.36
LV EJECT FRACT MOD BP 99901: 64.65
MCH RBC QN AUTO: 30.1 PG (ref 27–33)
MCHC RBC AUTO-ENTMCNC: 32.1 G/DL (ref 33.7–35.3)
MCV RBC AUTO: 93.7 FL (ref 81.4–97.8)
PLATELET # BLD AUTO: 250 K/UL (ref 164–446)
PMV BLD AUTO: 10.1 FL (ref 9–12.9)
POTASSIUM SERPL-SCNC: 4.4 MMOL/L (ref 3.6–5.5)
RBC # BLD AUTO: 2.69 M/UL (ref 4.7–6.1)
SODIUM SERPL-SCNC: 131 MMOL/L (ref 135–145)
TIBC SERPL-MCNC: 239 UG/DL (ref 250–450)
UIBC SERPL-MCNC: 200 UG/DL (ref 110–370)
WBC # BLD AUTO: 7.7 K/UL (ref 4.8–10.8)

## 2020-09-13 PROCEDURE — A9270 NON-COVERED ITEM OR SERVICE: HCPCS | Performed by: INTERNAL MEDICINE

## 2020-09-13 PROCEDURE — 93306 TTE W/DOPPLER COMPLETE: CPT

## 2020-09-13 PROCEDURE — 99233 SBSQ HOSP IP/OBS HIGH 50: CPT | Performed by: INTERNAL MEDICINE

## 2020-09-13 PROCEDURE — 80048 BASIC METABOLIC PNL TOTAL CA: CPT

## 2020-09-13 PROCEDURE — 83550 IRON BINDING TEST: CPT

## 2020-09-13 PROCEDURE — 700102 HCHG RX REV CODE 250 W/ 637 OVERRIDE(OP): Performed by: INTERNAL MEDICINE

## 2020-09-13 PROCEDURE — 700111 HCHG RX REV CODE 636 W/ 250 OVERRIDE (IP): Performed by: INTERNAL MEDICINE

## 2020-09-13 PROCEDURE — 85027 COMPLETE CBC AUTOMATED: CPT

## 2020-09-13 PROCEDURE — 770020 HCHG ROOM/CARE - TELE (206)

## 2020-09-13 PROCEDURE — 82728 ASSAY OF FERRITIN: CPT

## 2020-09-13 PROCEDURE — 93306 TTE W/DOPPLER COMPLETE: CPT | Mod: 26 | Performed by: INTERNAL MEDICINE

## 2020-09-13 PROCEDURE — 93010 ELECTROCARDIOGRAM REPORT: CPT | Performed by: INTERNAL MEDICINE

## 2020-09-13 PROCEDURE — 83540 ASSAY OF IRON: CPT

## 2020-09-13 RX ADMIN — TRAMADOL HYDROCHLORIDE 50 MG: 50 TABLET, FILM COATED ORAL at 20:40

## 2020-09-13 RX ADMIN — AMITRIPTYLINE HYDROCHLORIDE 50 MG: 50 TABLET, FILM COATED ORAL at 20:40

## 2020-09-13 RX ADMIN — TRAMADOL HYDROCHLORIDE 50 MG: 50 TABLET, FILM COATED ORAL at 13:19

## 2020-09-13 RX ADMIN — TICAGRELOR 90 MG: 90 TABLET ORAL at 17:20

## 2020-09-13 RX ADMIN — ENOXAPARIN SODIUM 40 MG: 40 INJECTION SUBCUTANEOUS at 06:17

## 2020-09-13 RX ADMIN — TRAMADOL HYDROCHLORIDE 50 MG: 50 TABLET, FILM COATED ORAL at 04:25

## 2020-09-13 RX ADMIN — TICAGRELOR 90 MG: 90 TABLET ORAL at 00:41

## 2020-09-13 RX ADMIN — ATORVASTATIN CALCIUM 80 MG: 20 TABLET, FILM COATED ORAL at 17:19

## 2020-09-13 RX ADMIN — ASPIRIN 81 MG: 81 TABLET, COATED ORAL at 06:18

## 2020-09-13 ASSESSMENT — ENCOUNTER SYMPTOMS
NERVOUS/ANXIOUS: 0
NAUSEA: 0
PALPITATIONS: 0
SPUTUM PRODUCTION: 0
SENSORY CHANGE: 0
VOMITING: 0
SHORTNESS OF BREATH: 0
FOCAL WEAKNESS: 0
PND: 0
SPEECH CHANGE: 0
EYES NEGATIVE: 1
ORTHOPNEA: 0
BRUISES/BLEEDS EASILY: 0
HEADACHES: 1
COUGH: 0
FEVER: 0
SORE THROAT: 0
CHILLS: 0

## 2020-09-13 NOTE — CARE PLAN
Problem: Bowel/Gastric:  Goal: Normal bowel function is maintained or improved  Outcome: PROGRESSING AS EXPECTED  Ambulates with walker to bathroom, BM today, no need for stool softeners, adequate PO intake for hydration.    Problem: Knowledge Deficit  Goal: Knowledge of disease process/condition, treatment plan, diagnostic tests, and medications will improve  Outcome: PROGRESSING AS EXPECTED   Explained disease process along with assistance from ICU and cardiologist, plan of care, and discharge planning in the coming day.

## 2020-09-13 NOTE — PROGRESS NOTES
Critical Care Progress Note    Date of admission  9/12/2020    Chief Complaint  65 y.o. male with a past medical history of CAD with STEMI 8/2019 status post stent, hypertension and recent ankle fracture for which she has been casted more than a month who presented 9/12/2020 with chest pain 10 out of 10 x 1 hour that substernal and similar to the chest pain he had about a year ago with his myocardial infarction.  He has associated shortness of breath but no nausea or vomiting.  He denied palpitations.  Code STEMI was activated and cardiology took the patient to the Cath Lab and found 100% obstructed LAD stent with thrombus.  EF was 30%.  He had successful PCI of LAD with an Liborio OSCAR.  Patient did have some transient A. fib prior to opening up the artery.  Chest pain completely resolved with reperfusion.  Patient had hypotension in the Cath Lab and was started on norepinephrine which is weaning nicely and down to only 1 deborah.  Patient seen in both the Cath Lab and the [facility]. (admit HPI)    Hospital Course   9/12 - Admit CP c/w MI, Cath lab LAD stent thrombosed, opened and new OSCAR stent placed, hypotensive for few hrs on NE drip      Interval Problem Update  Reviewed last 24 hour events:    A&O x4  No pain since LAD opened  SB/SR, no ectopy since cath lab  SBp 90-100s  Echocardiogram pending  Norepinephrine infusion weaned off after several hrs yesterday, BP borderline soft  Room air  Afebrile, WBC 7.7  No pain R foot which is casted  Sodium 131  Hemoglobin 8.1  Iron studies noted  Hemoccults pending  ASA/Brilinta/Lovenox  Statin    Review of Systems  Review of Systems   Constitutional: Negative for chills, fever and malaise/fatigue.   HENT: Negative for congestion, nosebleeds and sore throat.    Eyes: Negative.    Respiratory: Negative for cough, sputum production and shortness of breath.    Cardiovascular: Negative for chest pain, palpitations, orthopnea, leg swelling and PND.   Gastrointestinal: Negative for nausea  and vomiting.   Genitourinary: Negative.    Musculoskeletal: Positive for joint pain (Minimal right ankle).   Neurological: Positive for headaches. Negative for sensory change, speech change and focal weakness.   Endo/Heme/Allergies: Does not bruise/bleed easily.   Psychiatric/Behavioral: The patient is not nervous/anxious.         Vital Signs for last 24 hours   Temp:  [35.8 °C (96.4 °F)-36.7 °C (98 °F)] 36.4 °C (97.5 °F)  Pulse:  [47-88] 65  Resp:  [10-40] 16  BP: ()/(36-86) 96/58  SpO2:  [87 %-100 %] 87 %    Hemodynamic parameters for last 24 hours       Respiratory Information for the last 24 hours       Physical Exam   Physical Exam  Vitals signs reviewed.   Constitutional:       Appearance: He is normal weight. He is not ill-appearing or toxic-appearing.   HENT:      Head: Normocephalic and atraumatic.      Mouth/Throat:      Mouth: Mucous membranes are moist.   Eyes:      General: No scleral icterus.     Extraocular Movements: Extraocular movements intact.      Pupils: Pupils are equal, round, and reactive to light.   Neck:      Musculoskeletal: Neck supple.      Vascular: No JVD.      Trachea: Phonation normal.   Cardiovascular:      Rate and Rhythm: Normal rate and regular rhythm.      Pulses: Normal pulses.      Heart sounds: No murmur. No gallop.       Comments: AF in Cath Lab, sinus rhythm since landing in the [facility]  Pulmonary:      Effort: No respiratory distress.      Breath sounds: No wheezing, rhonchi or rales.   Abdominal:      General: Abdomen is flat. Bowel sounds are normal. There is no distension.      Palpations: Abdomen is soft.      Tenderness: There is no abdominal tenderness. There is no right CVA tenderness or left CVA tenderness.   Musculoskeletal:      Right lower leg: No edema.      Left lower leg: No edema.      Comments: Right ankle cast   Lymphadenopathy:      Cervical: No cervical adenopathy.   Skin:     General: Skin is warm and dry.      Capillary Refill: Capillary refill  takes less than 2 seconds.      Coloration: Skin is not cyanotic.      Nails: There is no clubbing.     Neurological:      General: No focal deficit present.      Mental Status: He is alert and oriented to person, place, and time. Mental status is at baseline.      Comments: Follows well   Psychiatric:         Attention and Perception: Attention normal.         Mood and Affect: Mood normal. Mood is not anxious.         Speech: Speech normal.         Behavior: Behavior normal. Behavior is not agitated. Behavior is cooperative.         Thought Content: Thought content normal.         Cognition and Memory: Cognition normal.         Medications  Current Facility-Administered Medications   Medication Dose Route Frequency Provider Last Rate Last Dose   • aspirin EC (ECOTRIN) tablet 81 mg  81 mg Oral DAILY Jean Weiner M.D.   81 mg at 09/13/20 0618   • acetaminophen (TYLENOL) tablet 650 mg  650 mg Oral Q6HRS PRN Jean Weiner M.D.       • ticagrelor (BRILINTA) tablet 90 mg  90 mg Oral BID Jean Weiner M.D.   90 mg at 09/13/20 0041   • amitriptyline (ELAVIL) tablet 50 mg  50 mg Oral Nightly Jean Gagnon M.D.   50 mg at 09/12/20 2000   • atorvastatin (LIPITOR) tablet 80 mg  80 mg Oral Q EVENING Jean Gagnon M.D.   80 mg at 09/12/20 1718   • senna-docusate (PERICOLACE or SENOKOT S) 8.6-50 MG per tablet 2 Tab  2 Tab Oral BID Jean Gagnon M.D.        And   • polyethylene glycol/lytes (MIRALAX) PACKET 1 Packet  1 Packet Oral QDAY PRN Jean Gagnon M.D.        And   • magnesium hydroxide (MILK OF MAGNESIA) suspension 30 mL  30 mL Oral QDAY PRN Jean Gagnon M.D.        And   • bisacodyl (DULCOLAX) suppository 10 mg  10 mg Rectal QDAY PRN Jean Gagnon M.D.       • Respiratory Therapy Consult   Nebulization Continuous RT Jean Gagnon M.D.       • enoxaparin (LOVENOX) inj 40 mg  40 mg Subcutaneous DAILY Jean Gagnon M.D.   40 mg at 09/13/20 0617   • ondansetron (ZOFRAN)  syringe/vial injection 4 mg  4 mg Intravenous Q4HRS PRN Jean Gagnon M.D.       • ondansetron (ZOFRAN ODT) dispertab 4 mg  4 mg Oral Q4HRS PRN Jean Gagnon M.D.       • tramadol (ULTRAM) 50 MG tablet 50 mg  50 mg Oral Q6HRS PRN Jean Gagnon M.D.   50 mg at 09/13/20 0425       Fluids    Intake/Output Summary (Last 24 hours) at 9/13/2020 1038  Last data filed at 9/13/2020 0900  Gross per 24 hour   Intake 1823.42 ml   Output 2200 ml   Net -376.58 ml       Laboratory          Recent Labs     09/12/20  1010 09/13/20  0420   SODIUM 137 131*   POTASSIUM 3.7 4.4   CHLORIDE 108 103   CO2 16* 20   BUN 18 15   CREATININE 1.00 1.02   MAGNESIUM 1.6  --    CALCIUM 7.2* 7.6*     Recent Labs     09/12/20  1010 09/13/20  0420   ALTSGPT 15  --    ASTSGOT 9*  --    ALKPHOSPHAT 72  --    TBILIRUBIN <0.2  --    GLUCOSE 131* 80     Recent Labs     09/12/20  1010 09/13/20  0420   WBC 7.3 7.7   NEUTSPOLYS 46.70  --    LYMPHOCYTES 38.50  --    MONOCYTES 10.70  --    EOSINOPHILS 3.00  --    BASOPHILS 0.70  --    ASTSGOT 9*  --    ALTSGPT 15  --    ALKPHOSPHAT 72  --    TBILIRUBIN <0.2  --      Recent Labs     09/12/20  1010 09/13/20  0420   RBC 2.79* 2.69*   HEMOGLOBIN 8.4* 8.1*   HEMATOCRIT 25.6* 25.2*   PLATELETCT 293 250   PROTHROMBTM 13.8  --    INR 1.03  --    IRON  --  39*   FERRITIN  --  64.1   TOTIRONBC  --  239*       Imaging  X-Ray:  I have personally reviewed the images and compared with prior images.  EKG:  I have personally reviewed the images and compared with prior images.    Assessment/Plan  * STEMI (ST elevation myocardial infarction) (HCC)- (present on admission)  Assessment & Plan  Presented to ER with 1 hour of 10/10 substernal chest pain similar to pain he had with his first STEMI 8/2019  Aspirin administered in the ER  LAD thrombosis in the old stent opened in the Cath Lab and OSCAR stent placed  Integrilin bolus and Cath Lab  Brilinta  Echocardiogram per cardiology  Risk factor modification-smoking  cessation  ACE/statin/beta-blocker when clinically appropriate  Hemodynamically better, weaned off norepinephrine just a few hours after Cath Lab  No chest pain today    Hypotension  Assessment & Plan  Hypotension secondary to STEMI, resolved  EF 30% pre-opening LAD thrombosis  Norepinephrine actively being titrated, weaned off within a few hours of arriving [facility]  Appears euvolemic clinically  Holding antihypertensive regimen    H/O ST elevation myocardial infarction  Assessment & Plan  History of LAD STEMI 8/2019 status post stenting    Normochromic anemia  Assessment & Plan  Persisting normochromic anemia  Unclear etiology, negative ROS  Hemoccult stools pending  Transfuse keep hemoglobin greater than 8 in the setting of STEMI  Iron studies noted, does have follow-up appointment next week with primary physician continue evaluation, patient is encouraged to follow-up with him in that regard      Nicotine abuse- (present on admission)  Assessment & Plan  Smoking cessation strongly encouraged again  46-pack-year history of smoking, denies COPD symptoms  Chest x-ray clear, O2 saturations good  Flu shot to be encouraged, they should be available in the next few weeks    Essential hypertension- (present on admission)  Assessment & Plan  Currently hypotensive post Cath Lab on low-dose norepinephrine  Hold home antihypertensive regimen  Clinically appropriate beta-blocker/ACE inhibitor regimen for BP/post MI per cardiology    Ankle fracture, right  Assessment & Plan  Recent right ankle fracture requiring ORIF >1 month ago  No clinical signs or symptoms to suggest DVT or PE  DVT Prophylaxis      Chronic neck pain- (present on admission)  Assessment & Plan  Heat, stretching, NSAIDs as needed-resume home regimen as clinically appropriate    Hyperglycemia- (present on admission)  Assessment & Plan  No history diabetes  Blood sugar only mildly elevated  SSI/hypoglycemia protocols as clinically appropriate  Pain A1c if blood  sugar remains elevated/trends up    Hemorrhoids- (present on admission)  Assessment & Plan  History hemorrhoids, no complaints today  Monitor for bleeding       VTE:  Lovenox  Ulcer: Not Indicated  Lines: None    I have performed a physical exam and reviewed and updated ROS and Plan today (9/13/2020). In review of yesterday's note (9/12/2020), there are no changes except as documented above.     Discussed patient condition and risk of morbidity and/or mortality with RN, RT, Pharmacy, Charge nurse / hot rounds, Patient and cardiology    Patient clinically doing well, will transfer to telemetry, cardiology will take over role as attending, no significant medical issues at this time, patient does have a follow-up with his primary physician he stated on 9/17, I encouraged him to follow-up at that time in regards to anemia as well his other medical issues.  RCC will sign off to Dr. Weiner.

## 2020-09-13 NOTE — PROGRESS NOTES
Interval History:  65-year-old male with a history of prior anterior STEMI status post primary PCI 8/2019 presented 9/12 with acute anterior MI due to stent thrombosis status post primary PCI with a very short ischemic time and resultant left ventricular stunning.  He was recently taken off of his Brilinta dual antiplatelet therapy.  On further discussion he indicates he inadvertently stopped taking his aspirin for about 2 weeks as well.  9/13: No overnight cardiac events.  Off pressors.  Anemia chronic and stable.  No chest pain.    Physical Exam   Blood pressure (!) 96/58, pulse 65, temperature 36.4 °C (97.5 °F), temperature source Temporal, resp. rate 16, weight 61 kg (134 lb 7.7 oz), SpO2 (!) 87 %.    Constitutional: Appears well-developed.   HENT: Normocephalic and atraumatic. No scleral icterus.   Neck: No JVD present.   Cardiovascular: Normal rate. Exam reveals no gallop and no friction rub. No murmur heard.  Radial cath access site CDI.  Pulmonary/Chest: CTAB    Abdominal: S/NT/ND BS+   Musculoskeletal: Exhibits no edema. Pulses present.  Skin: Skin is warm and dry.     ROS: As HPI other reviewed and negative       Intake/Output Summary (Last 24 hours) at 9/13/2020 0916  Last data filed at 9/13/2020 0800  Gross per 24 hour   Intake 1583.42 ml   Output 2200 ml   Net -616.58 ml       Recent Labs     09/12/20  1010 09/13/20  0420   WBC 7.3 7.7   RBC 2.79* 2.69*   HEMOGLOBIN 8.4* 8.1*   HEMATOCRIT 25.6* 25.2*   MCV 91.8 93.7   MCH 30.1 30.1   MCHC 32.8* 32.1*   RDW 44.4 46.5   PLATELETCT 293 250   MPV 10.0 10.1     Recent Labs     09/12/20  1010 09/13/20  0420   SODIUM 137 131*   POTASSIUM 3.7 4.4   CHLORIDE 108 103   CO2 16* 20   GLUCOSE 131* 80   BUN 18 15   CREATININE 1.00 1.02   CALCIUM 7.2* 7.6*     Recent Labs     09/12/20  1010   INR 1.03                   No current facility-administered medications on file prior to encounter.      Current Outpatient Medications on File Prior to Encounter   Medication  Sig Dispense Refill   • ticagrelor (BRILINTA) 90 MG Tab tablet Take 1 Tab by mouth 2 Times a Day. 60 Tab 1   • aspirin EC 81 MG EC tablet Take 1 Tab by mouth every day. 30 Tab 0   • atorvastatin (LIPITOR) 80 MG tablet Take 1 Tab by mouth every evening. 30 Tab 0   • lisinopril (PRINIVIL) 5 MG Tab Take 1 Tab by mouth every 12 hours. 30 Tab 0   • metoprolol SR (TOPROL XL) 25 MG TABLET SR 24 HR Take 1 Tab by mouth every day. 30 Tab 0   • spironolactone (ALDACTONE) 25 MG Tab Take 1 Tab by mouth every day. 30 Tab 3   • buprenorphine-naloxone (SUBOXONE) 8-2 MG SL Tab Place 1 Tab under tongue 2 Times a Day. Pain     • amitriptyline (ELAVIL) 50 MG Tab Take 50 mg by mouth every evening.         Current Facility-Administered Medications   Medication Dose Frequency Provider Last Rate Last Dose   • aspirin EC (ECOTRIN) tablet 81 mg  81 mg DAILY Jean Weiner M.D.   81 mg at 09/13/20 0618   • acetaminophen (TYLENOL) tablet 650 mg  650 mg Q6HRS PRN Jean Weiner M.D.       • ticagrelor (BRILINTA) tablet 90 mg  90 mg BID Jean Weiner M.D.   90 mg at 09/13/20 0041   • amitriptyline (ELAVIL) tablet 50 mg  50 mg Nightly Jean Gagnon M.D.   50 mg at 09/12/20 2000   • atorvastatin (LIPITOR) tablet 80 mg  80 mg Q EVENING Jean Gagnon M.D.   80 mg at 09/12/20 1718   • senna-docusate (PERICOLACE or SENOKOT S) 8.6-50 MG per tablet 2 Tab  2 Tab BID Jean Gagnon M.D.        And   • polyethylene glycol/lytes (MIRALAX) PACKET 1 Packet  1 Packet QDAY PRN Jean Gagnon M.D.        And   • magnesium hydroxide (MILK OF MAGNESIA) suspension 30 mL  30 mL QDAY PRN Jean Gagnon M.D.        And   • bisacodyl (DULCOLAX) suppository 10 mg  10 mg QDAY PRN Jean Gagnon M.D.       • Respiratory Therapy Consult   Continuous RT Jean Gagnon M.D.       • enoxaparin (LOVENOX) inj 40 mg  40 mg DAILY Jean Gagnon M.D.   40 mg at 09/13/20 0617   • ondansetron (ZOFRAN) syringe/vial injection 4 mg  4 mg  Q4HRS PRN Jean Gagnon M.D.       • ondansetron (ZOFRAN ODT) dispertab 4 mg  4 mg Q4HRS PRN Jean Gagnon M.D.       • norepinephrine (Levophed) infusion 8 mg/250 mL (premix)  0-30 mcg/min Continuous Jean Gagnon M.D.   Stopped at 09/12/20 1451   • tramadol (ULTRAM) 50 MG tablet 50 mg  50 mg Q6HRS PRN Jean Gagnon M.D.   50 mg at 09/13/20 0425   Last reviewed on 9/12/2020  3:31 PM by Bolivar Amin R.N.    Medications reviewed    Imaging reviewed    ECHO(pending):    CORONARY ANGIOGRAM(9/12/2020):  1.  100% thrombotically occluded proximal LAD stent  2.  Mild nonobstructive CAD otherwise  3.  LVEF 30% prior to intervention  4.  Successful IVIU guided PCI culprit LAD (3.0 x 34 mm Liborio OSCAR), excellent result       Impressions:  1.  Anterior STEMI status post primary PCI  2.  Ischemic cardiomyopathy LVEF 30%, suspect moderate component of ventricular stunning  3.  Chronic normocytic anemia  4.  Hypertension  5.  Hyponatremia    Recommendations:  Recommend lifelong dual antiplatelet therapy due to stent thrombosis and now 2 layers of proximal LAD stenting.  Most likely precipitated by his inadvertent discontinuation of aspirin.  Recommend optimization of his medical therapy as his hemodynamics tolerate.    1.  Lifelong DAPT  2.  Statin  3.  Holding ACE inhibitor and Toprol-XL due to hypotension, reinstitute as tolerated  4.  Echocardiogram pending  5.  Transfer to telemetry    Discussed with primary physician and bedside nursing.  Okay to transfer to telemetry.

## 2020-09-13 NOTE — CARE PLAN
Problem: Safety  Goal: Will remain free from falls  Outcome: PROGRESSING AS EXPECTED  Intervention: Implement fall precautions  Note: Pt encouraged to use call light for assistance.      Problem: Pain Management  Goal: Pain level will decrease to patient's comfort goal  Outcome: PROGRESSING AS EXPECTED  Intervention: Follow pain managment plan developed in collaboration with patient and Interdisciplinary Team  Note: Pt following pain management plan developed by interdisciplinary team.

## 2020-09-13 NOTE — PROGRESS NOTES
0810: Dr. Gagnon at bedside with patient discussing POC, no new orders at this time. Await cardiology input.

## 2020-09-14 ENCOUNTER — PATIENT OUTREACH (OUTPATIENT)
Dept: HEALTH INFORMATION MANAGEMENT | Facility: OTHER | Age: 65
End: 2020-09-14

## 2020-09-14 VITALS
SYSTOLIC BLOOD PRESSURE: 114 MMHG | BODY MASS INDEX: 19.92 KG/M2 | DIASTOLIC BLOOD PRESSURE: 73 MMHG | HEART RATE: 68 BPM | WEIGHT: 134.48 LBS | RESPIRATION RATE: 18 BRPM | OXYGEN SATURATION: 100 % | TEMPERATURE: 97.3 F | HEIGHT: 69 IN

## 2020-09-14 PROCEDURE — 700102 HCHG RX REV CODE 250 W/ 637 OVERRIDE(OP): Performed by: INTERNAL MEDICINE

## 2020-09-14 PROCEDURE — 97535 SELF CARE MNGMENT TRAINING: CPT

## 2020-09-14 PROCEDURE — A9270 NON-COVERED ITEM OR SERVICE: HCPCS | Performed by: INTERNAL MEDICINE

## 2020-09-14 PROCEDURE — 700111 HCHG RX REV CODE 636 W/ 250 OVERRIDE (IP): Performed by: INTERNAL MEDICINE

## 2020-09-14 RX ORDER — ASPIRIN 81 MG/1
81 TABLET ORAL DAILY
Qty: 100 TAB | Refills: 3 | Status: SHIPPED | OUTPATIENT
Start: 2020-09-15 | End: 2020-10-16

## 2020-09-14 RX ADMIN — TRAMADOL HYDROCHLORIDE 50 MG: 50 TABLET, FILM COATED ORAL at 05:18

## 2020-09-14 RX ADMIN — ASPIRIN 81 MG: 81 TABLET, COATED ORAL at 05:13

## 2020-09-14 RX ADMIN — ENOXAPARIN SODIUM 40 MG: 40 INJECTION SUBCUTANEOUS at 05:13

## 2020-09-14 RX ADMIN — TICAGRELOR 90 MG: 90 TABLET ORAL at 05:13

## 2020-09-14 ASSESSMENT — ENCOUNTER SYMPTOMS
RESPIRATORY NEGATIVE: 1
WEAKNESS: 0
CONSTITUTIONAL NEGATIVE: 1
ABDOMINAL PAIN: 0
PSYCHIATRIC NEGATIVE: 1
MUSCULOSKELETAL NEGATIVE: 1
DIZZINESS: 0
NAUSEA: 0
FEVER: 0
BRUISES/BLEEDS EASILY: 0
NEUROLOGICAL NEGATIVE: 1
NERVOUS/ANXIOUS: 0
EYES NEGATIVE: 1
VOMITING: 0
GASTROINTESTINAL NEGATIVE: 1
CHILLS: 0
CARDIOVASCULAR NEGATIVE: 1
MYALGIAS: 0
COUGH: 0
HEADACHES: 0
PALPITATIONS: 0
SHORTNESS OF BREATH: 0

## 2020-09-14 NOTE — DISCHARGE PLANNING
Anticipated Discharge Disposition: Home w/ no needs    Action: Patient had heart cath on 9/12/2020 w/ LAD being stented.  Per notes, patient has already been given his [facility].      Barriers to Discharge: none    Plan: RN CM to follow up with patient for any dc planning needs.

## 2020-09-14 NOTE — PROGRESS NOTES
Cardiology Follow Up Progress Note    Date of Service  9/14/2020    Attending Physician  Jean Weiner M.D.    Chief Complaint   Instent thrombosis.    BONNY Moore is a 65 y.o. male admitted 9/12/2020 with above.    Interim Events  No significant changes noted from cardiac standpoint within the past 24 hours.    Review of Systems  Review of Systems   Constitutional: Negative.  Negative for chills and fever.   HENT: Negative.  Negative for hearing loss.    Eyes: Negative.    Respiratory: Negative.  Negative for cough and shortness of breath.    Cardiovascular: Negative.  Negative for chest pain, palpitations and leg swelling.   Gastrointestinal: Negative.  Negative for abdominal pain, nausea and vomiting.   Genitourinary: Negative.  Negative for dysuria and urgency.   Musculoskeletal: Negative.  Negative for myalgias.   Skin: Negative.  Negative for rash.   Neurological: Negative.  Negative for dizziness, weakness and headaches.   Hematological: Does not bruise/bleed easily.   Psychiatric/Behavioral: Negative.  The patient is not nervous/anxious.        Vital signs in last 24 hours  Temp:  [36.3 °C (97.3 °F)-37.2 °C (99 °F)] 37.2 °C (99 °F)  Pulse:  [46-73] 73  Resp:  [16-27] 17  BP: ()/(54-72) 102/63  SpO2:  [86 %-100 %] 90 %    Physical Exam  Physical Exam  Constitutional:       Appearance: He is well-developed.   HENT:      Head: Normocephalic and atraumatic.   Eyes:      Conjunctiva/sclera: Conjunctivae normal.      Pupils: Pupils are equal, round, and reactive to light.   Neck:      Musculoskeletal: Normal range of motion and neck supple.   Cardiovascular:      Rate and Rhythm: Normal rate and regular rhythm.   Pulmonary:      Effort: Pulmonary effort is normal.      Breath sounds: Normal breath sounds.   Abdominal:      General: Bowel sounds are normal.      Palpations: Abdomen is soft.   Musculoskeletal: Normal range of motion.   Skin:     General: Skin is warm and dry.   Neurological:       Mental Status: He is alert and oriented to person, place, and time.         Lab Review  Lab Results   Component Value Date/Time    WBC 7.7 09/13/2020 04:20 AM    RBC 2.69 (L) 09/13/2020 04:20 AM    HEMOGLOBIN 8.1 (L) 09/13/2020 04:20 AM    HEMATOCRIT 25.2 (L) 09/13/2020 04:20 AM    MCV 93.7 09/13/2020 04:20 AM    MCH 30.1 09/13/2020 04:20 AM    MCHC 32.1 (L) 09/13/2020 04:20 AM    MPV 10.1 09/13/2020 04:20 AM      Lab Results   Component Value Date/Time    SODIUM 131 (L) 09/13/2020 04:20 AM    POTASSIUM 4.4 09/13/2020 04:20 AM    CHLORIDE 103 09/13/2020 04:20 AM    CO2 20 09/13/2020 04:20 AM    GLUCOSE 80 09/13/2020 04:20 AM    BUN 15 09/13/2020 04:20 AM    CREATININE 1.02 09/13/2020 04:20 AM      Lab Results   Component Value Date/Time    ASTSGOT 9 (L) 09/12/2020 10:10 AM    ALTSGPT 15 09/12/2020 10:10 AM     Lab Results   Component Value Date/Time    CHOLSTRLTOT 147 08/22/2019 04:42 AM    LDL 91 08/22/2019 04:42 AM    HDL 34 (A) 08/22/2019 04:42 AM    TRIGLYCERIDE 111 08/22/2019 04:42 AM    TROPONINT 17 09/12/2020 10:10 AM       No results for input(s): NTPROBNP in the last 72 hours.  (Above labs reviewed.)       Current Facility-Administered Medications:   •  aspirin EC (ECOTRIN) tablet 81 mg, 81 mg, Oral, DAILY, Jean Weiner M.D., 81 mg at 09/14/20 0513  •  acetaminophen (TYLENOL) tablet 650 mg, 650 mg, Oral, Q6HRS PRN, Jean Weiner M.D.  •  ticagrelor (BRILINTA) tablet 90 mg, 90 mg, Oral, BID, Jean Weiner M.D., 90 mg at 09/14/20 0513  •  amitriptyline (ELAVIL) tablet 50 mg, 50 mg, Oral, Nightly, Jean Gagnon M.D., 50 mg at 09/13/20 2040  •  atorvastatin (LIPITOR) tablet 80 mg, 80 mg, Oral, Q EVENING, Jean Gagnon M.D., 80 mg at 09/13/20 1719  •  senna-docusate (PERICOLACE or SENOKOT S) 8.6-50 MG per tablet 2 Tab, 2 Tab, Oral, BID **AND** polyethylene glycol/lytes (MIRALAX) PACKET 1 Packet, 1 Packet, Oral, QDAY PRN **AND** magnesium hydroxide (MILK OF MAGNESIA) suspension 30 mL, 30  mL, Oral, QDAY PRN **AND** bisacodyl (DULCOLAX) suppository 10 mg, 10 mg, Rectal, QDAY PRN, Jean Gagnon M.D.  •  Respiratory Therapy Consult, , Nebulization, Continuous RT, Jean Gagnon M.D.  •  enoxaparin (LOVENOX) inj 40 mg, 40 mg, Subcutaneous, DAILY, Jean Gagnon M.D., 40 mg at 09/14/20 0513  •  ondansetron (ZOFRAN) syringe/vial injection 4 mg, 4 mg, Intravenous, Q4HRS PRN, Jean Gagnon M.D.  •  ondansetron (ZOFRAN ODT) dispertab 4 mg, 4 mg, Oral, Q4HRS PRN, Jean Gagnon M.D.  •  tramadol (ULTRAM) 50 MG tablet 50 mg, 50 mg, Oral, Q6HRS PRN, Jean Gagnon M.D., 50 mg at 09/14/20 0518  (Medications reviewed.)    Cardiac Imaging and Procedures Review       CARDIAC STUDIES AND PROCEDURES:      CARDIAC CATHETERIZATION CONCLUSIONS by Jean Judd (09/12/20)  1.  100% thrombotically occluded proximal LAD stent  2.  Mild nonobstructive CAD otherwise  3.  LVEF 30% prior to intervention  4.  Successful IVIU guided PCI culprit LAD (3.0 x 34 mm [place] OSCAR), excellent result  (study result reviewed)    CARDIAC CATHETERIZATION CONCLUSIONS (08/21/19)  1.  Coronary artery disease with occluded proximal and mid left anterior   descending artery.  2.  Successful thrombectomy/percutaneous transluminal coronary angioplasty,  stent placement of the proximal left anterior descending artery with 2.5x20 mm   Synergy drug-eluting stent.  3.  Successful percutaneous transluminal coronary angioplasty,stent placement   of the mid left anterior descending artery with 2.25x28 mm Synergy   drug-eluting stent.  4.  Reduced left ventricular systolic function with ejection fraction of 30%.  5.  Elevated left ventricular end-diastolic pressure.  (study result reviewed)    ECHOCARDIOGRAM CONCLUSIONS (09/13/20)  Normal left ventricular systolic function.  Left ventricular ejection fraction is visually estimated to be 60%.  Mild hypokinesis of the mid anteroseptal wall.  Indeterminate diastolic  function.  Normal right ventricular size and systolic function.  Biatrial enlargement  No significant valvular regurgitation or stenosis.   Normal estimated right atrial pressure  Unable to estimate pulmonary artery pressure due to an inadequate   tricuspid regurgitant jet.  Compared to the images of the prior study done 8/22/2019: The mid   anteroseptal wall motion abnormality is new.   (study result reviewed)     EKG performed on (09/12/20) was reviewed: EKG personally interpreted shows sinus bradycardia.  EKG performed on (08/02/19) EKG shows sinus rhythm with large ST elevation of the anterior leads.    Assessment/Plan  1. Coronary artery disease with stent placement: He is clinically doing well without recurrence of his angina.  We will continue with current medical care including aspirin, ticagrelor and atorvastatin. We will start beta-blockade therapy with metoprolol 25 mg BID.  2. Hypertension: Blood pressure is well controlled. We will continue with above plan.  3.Hyperlipidemia: He is doing well on statin therapy without myalgia symptoms.  4. Disposition: The patient may be discharged to home from cardiac standpoint. I started discussions of discharge instructions including but not excluded to limitation of activity, medications, follow ups as well as answering questions or concerns.    Thank you for allowing me to participate in the care of this patient.  Cardiology will sign off on this patient    Please contact me with any questions.    Omari Carlisle M.D.   Cardiologist, [facility]  [phone]

## 2020-09-14 NOTE — PROGRESS NOTES
Received bedside report from RN, pt care assumed, VSS, pt assessment complete. Pt AAOx4 no complaint of pain at this time. No signs of acute distress noted at this time. POC discussed with pt and verbalizes no questions. Pt denies any additional needs at this time. Bed in lowest position, patient up self, call light within reach, hourly rounding initiated.

## 2020-09-14 NOTE — PROGRESS NOTES
ACS Navigator Consult Note     Anterior STEMI status post primary PCI.   100% thrombotically occluded proximal LAD stent      Management: PCI , restarted DAPT    Current assessment of LV Function shows: 60% per echo 9/13.    Reviewed ACS medications:  · DAPT: aspirin + ticagrelor (BRILINTA) Please note: for ACS patients who are treated medically without PCI and stenting, DAPT has been demonstrated to reduce recurrent CV events. Source: 2013 ACCF/AHA Guideline for the management of STEMI  · Beta-Blocker:  toprol XL   · Statin:atorvastatin (LIPITOR) tablet 80 mg    · Consider for aldosterone blockade? spironalactone  · Consider for ACE-I/ARB/ARNI?  lisinopril     Meds to Beds BEDSIDE NURSING RESPONSIBILITIES:  Pt hs RX for Brilinta    If not already done, please assess patient for Meds to Beds Opt-In which can be found in the admit navigator (see below). Evidence shows that meds to beds improves medication compliance and patient outcomes.          Intensive Cardiac Rehab (ICR) Referral  Referred on 9/12/20; has current inpatient orders for nutrition consult & PT for Phase I ICR    Smoking Cessation?  Indicated? Yes- Current everyday smoker per [facility]  Documented Yes by physician:Smoking cessation strongly encouraged  46-pack-year history of smoking, denies COPD symptoms  Chest x-ray clear, O2 saturations good       Flu shot to be encouraged  Demographics  Patient resides in: [place]  Insurance: Medicare- Sees [facility]    Inpatient & Discharge Patient Education  Bedside nursing to continually provide patient education on ACS meds, signs and symptoms to monitor for, and risk factor modification.     Also at discharge please complete the “Acute Coronary Syndrome” special instructions on the AVS:          Follow up  [facility]    Thank you, Jessica Graham Heart Failure , ACS Reviewer [phone]

## 2020-09-14 NOTE — CARE PLAN
Problem: Communication  Goal: The ability to communicate needs accurately and effectively will improve  Outcome: PROGRESSING AS EXPECTED   Educated pt to express all needs during the shift. Pt verbalized understanding.     Problem: Safety  Goal: Will remain free from falls  Outcome: PROGRESSING AS EXPECTED   Educated pt to call for assistance when getting out of bed to ambulating to the bathroom.

## 2020-09-14 NOTE — THERAPY
Pt is s/p STEMI and PCI presenting to PT cardiac rehab phase I eval receptive to education provided. He was able to verbalize understanding with repeat back to PT after education regarding Talk Test, RPE scale, HR monitoring, home walking program, and daily weights. Pt reported he has been less mobile since his ankle injury but will be able to initiate walking program with FWW. Pt eager to utilize Talk Test as he reports being SOB as one of his MI symptoms. At this time, no further acute PT is required. Please refer pt to OP cardiac rehab when in appropriate stage of recovery.

## 2020-09-14 NOTE — PROGRESS NOTES
Patient belongings summary     Full set of dentures  Cell phone   Sweat pants   Wallet   One slipper

## 2020-09-14 NOTE — DISCHARGE INSTRUCTIONS
Discharge Instructions    Discharged to home by car with relative. Discharged via wheelchair, hospital escort: Yes.  Special equipment needed: Not Applicable    Be sure to schedule a follow-up appointment with your primary care doctor or any specialists as instructed.     Discharge Plan:   Diet Plan: Discussed  Activity Level: Discussed  Confirmed Follow up Appointment: Patient to Call and Schedule Appointment  Confirmed Symptoms Management: Discussed  Medication Reconciliation Updated: Yes    I understand that a diet low in cholesterol, fat, and sodium is recommended for good health. Unless I have been given specific instructions below for another diet, I accept this instruction as my diet prescription.     Special Instructions: Diagnosis:  Acute Coronary Syndrome (ACS) is a diagnosis that encompasses cardiac-related chest pain and heart attack. ACS occurs when the blood flow to the heart muscle is severely reduced or cut off completely due to a slow process called atherosclerosis.  Atherosclerosis is a disease in which the coronary arteries become narrow from a buildup of fat, cholesterol, and other substances that combine to form plaque. If the plaque breaks, a blood clot will form and block the blood flow to the heart muscle. This lack of blood flow can cause damage or death to the heart muscle which is called a heart attack or Myocardial Infarction (MI). There are two different types of MIs:  ST Elevation Myocardial Infarction or STEMI (the most severe type of heart attack) and Non-ST Elevation Myocardial Infarction or NSTEMI.    Treatment Plan:  · Cardiac Diet  - Low fat, low salt, low cholesterol   · Cardiac Rehab  - Your doctor has ordered you a referral to [facility].  Call [phone] to schedule an appointment.  · Attend my follow-up appointment with my Cardiologist.  · Take my medications as prescribed by my doctor  · Exercise daily  · Quit Smoking, Lower my bad cholesterol and raise my good cholesterol,  lower my blood pressure and Reduce stress    Medications:  Certain medications are used to treat ACS.  Remember to always take medications as prescribed and never stop talking medications unless told by your doctor.    You have been prescribed the following medicatons:    Aspirin - Aspirin is used as a blood thinning medication and you will require this medication indefinitely.  Anti-platelet/blood thinner - Your Anti-platelet/Blood thinning medication is called Brilinta, and is used in combination with aspirin to prevent clots from forming in your heart and/or around your stent.  Your doctor will determine how long you need to be on this medicine.  Beta-Blocker - Beta-Blocker metoprolol is used to lower blood pressure and heart rate, and/or helps your heart heal after a heart attack.  Statin - Statin atorvastatin is used to lower cholesterol.    · Is patient discharged on Warfarin / Coumadin?   No     Depression / Suicide Risk    As you are discharged from this [facility] facility, it is important to learn how to keep safe from harming yourself.    Recognize the warning signs:  · Abrupt changes in personality, positive or negative- including increase in energy   · Giving away possessions  · Change in eating patterns- significant weight changes-  positive or negative  · Change in sleeping patterns- unable to sleep or sleeping all the time   · Unwillingness or inability to communicate  · Depression  · Unusual sadness, discouragement and loneliness  · Talk of wanting to die  · Neglect of personal appearance   · Rebelliousness- reckless behavior  · Withdrawal from people/activities they love  · Confusion- inability to concentrate     If you or a loved one observes any of these behaviors or has concerns about self-harm, here's what you can do:  · Talk about it- your feelings and reasons for harming yourself  · Remove any means that you might use to hurt yourself (examples: pills, rope, extension cords,  firearm)  · Get professional help from the community (Mental Health, Substance Abuse, psychological counseling)  · Do not be alone:Call your Safe Contact- someone whom you trust who will be there for you.  · Call your local CRISIS HOTLINE 544-5294 or 801-824-2106  · Call your local Children's Mobile Crisis Response Team [place] (835) 616-5214 or www.[employer]  · Call the toll free National Suicide Prevention Hotlines   · National Suicide Prevention Lifeline 211-804-KSYN (4970)  · Shippable Line Network 800-SUICIDE (859-2901)      Heart Attack  A heart attack occurs when blood and oxygen supply to the heart is cut off. A heart attack causes damage to the heart that cannot be fixed. A heart attack is also called a myocardial infarction, or MI. If you think you are having a heart attack, do not wait to see if the symptoms will go away. Get medical help right away.  What are the causes?  This condition may be caused by:  · A fatty substance (plaque) in the blood vessels (arteries). This can block the flow of blood to the heart.  · A blood clot in the blood vessels that go to the heart. The blood clot blocks blood flow.  · Low blood pressure.  · An abnormal heartbeat.  · Some diseases, such as problems in red blood cells (anemia)orproblems in breathing (respiratory failure).  · Tightening (spasm) of a blood vessel that cuts off blood to the heart.  · A tear in a blood vessel of the heart.  · High blood pressure.  What increases the risk?  The following factors may make you more likely to develop this condition:  · Aging. The older you are, the higher your risk.  · Having a personal or family history of chest pain, heart attack, stroke, or narrowing of the arteries in the legs, arms, head, or stomach (peripheral artery disease).  · Being male.  · Smoking.  · Not getting regular exercise.  · Being overweight or obese.  · Having high blood pressure.  · Having high cholesterol.  · Having  diabetes.  · Drinking too much alcohol.  · Using illegal drugs, such as cocaine or methamphetamine.  What are the signs or symptoms?  Symptoms of this condition include:  · Chest pain. It may feel like:  ? Crushing or squeezing.  ? Tightness, pressure, fullness, or heaviness.  · Pain in the arm, neck, jaw, back, or upper body.  · Shortness of breath.  · Heartburn.  · Upset stomach (indigestion).  · Feeling like you may vomit (nauseous).  · Cold sweats.  · Feeling tired.  · Sudden light-headedness.  How is this treated?  A heart attack must be treated as soon as possible. Treatment may include:  · Medicines to:  ? Break up or dissolve blood clots.  ? Thin blood and help prevent blood clots.  ? Treat blood pressure.  ? Improve blood flow to the heart.  ? Reduce pain.  ? Reduce cholesterol.  · Procedures to widen a blocked artery and keep it open.  · Open heart surgery.  · Receiving oxygen.  · Making your heart strong again (cardiac rehabilitation) through exercise, education, and counseling.  Follow these instructions at home:  Medicines  · Take over-the-counter and prescription medicines only as told by your doctor. You may need to take medicine:  ? To keep your blood from clotting too easily.  ? To control blood pressure.  ? To lower cholesterol.  ? To control heart rhythms.  · Do not take these medicines unless your doctor says it is okay:  ? NSAIDs, such as ibuprofen.  ? Supplements that have vitamin A, vitamin E, or both.  ? Hormone replacement therapy that has estrogen with or without progestin.  Lifestyle         · Do not use any products that have nicotine or tobacco, such as cigarettes, e-cigarettes, and chewing tobacco. If you need help quitting, ask your doctor.  · Avoid secondhand smoke.  · Exercise regularly. Ask your doctor about a cardiac rehab program.  · Eat heart-healthy foods. Your doctor will tell you what foods to eat.  · Stay at a healthy weight.  · Lower your stress level.  · Do not use illegal  drugs.  Alcohol use  · Do not drink alcohol if:  ? Your doctor tells you not to drink.  ? You are pregnant, may be pregnant, or are planning to become pregnant.  · If you drink alcohol:  ? Limit how much you use to:  § 0-1 drink a day for women.  § 0-2 drinks a day for men.  ? Know how much alcohol is in your drink. In the [place] one drink equals one 12 oz bottle of beer (355 mL), one 5 oz glass of wine (148 mL), or one 1½ oz glass of hard liquor (44 mL).  General instructions  · Work with your doctor to treat other problems you may have, such as diabetes or high blood pressure.  · Get screened for depression. Get treatment if needed.  · Keep your vaccines up to date. Get the flu shot (influenza vaccine) every year.  · Keep all follow-up visits as told by your doctor. This is important.  Contact a doctor if:  · You feel very sad.  · You have trouble doing your daily activities.  Get help right away if:  · You have sudden, unexplained discomfort in your chest, arms, back, neck, jaw, or upper body.  · You have shortness of breath.  · You have sudden sweating or clammy skin.  · You feel like you may vomit.  · You vomit.  · You feel tired or weak.  · You get light-headed or dizzy.  · You feel your heart beating fast.  · You feel your heart skipping beats.  · You have blood pressure that is higher than 180/120.  These symptoms may be an emergency. Do not wait to see if the symptoms will go away. Get medical help right away. Call your local emergency services (911 in the [place] Do not drive yourself to the hospital.  Summary  · A heart attack occurs when blood and oxygen supply to the heart is cut off.  · Do not take NSAIDs unless your doctor says it is okay.  · Do not smoke. Avoid secondhand smoke.  · Exercise regularly. Ask your doctor about a cardiac rehab program.  This information is not intended to replace advice given to you by your health care provider. Make sure you discuss any questions you have with your health  care provider.  Document Released: 06/18/2013 Document Revised: 03/30/2020 Document Reviewed: 03/30/2020  Elsevier Patient Education © 2020 [employer]    Ticagrelor oral tablet  What is this medicine?  TICAGRELOR (DARRYN ka GREL or) helps to prevent blood clots. This medicine is used to prevent heart attack, stroke, or other vascular events in people who have had a recent heart attack or who have severe chest pain.  This medicine may be used for other purposes; ask your health care provider or pharmacist if you have questions.  COMMON BRAND NAME(S): CASA  What should I tell my health care provider before I take this medicine?  They need to know if you have any of these conditions:  · bleeding disorders  · bleeding in the brain  · having surgery  · history of irregular heartbeat  · history of stomach bleeding  · liver disease  · an unusual or allergic reaction to ticagrelor, other medicines, foods, dyes, or preservatives  · pregnant or trying to get pregnant  · breast-feeding  How should I use this medicine?  Take this medicine by mouth with a glass of water. Follow the directions on the prescription label. You can take it with or without food. If it upsets your stomach, take it with food. Take your medicine at regular intervals. Do not take it more often than directed. Do not stop taking except on your doctor's advice.  Talk to your pediatrician regarding the use of this medicine in children. Special care may be needed.  Overdosage: If you think you have taken too much of this medicine contact a poison control center or emergency room at once.  NOTE: This medicine is only for you. Do not share this medicine with others.  What if I miss a dose?  If you miss a dose, take it as soon as you can. If it is almost time for your next dose, take only that dose. Do not take double or extra doses.  What may interact with this medicine?  Do not take this medicine with any of the following  medications:  · defibrotide  · itraconazole  This medicine may also interact with the following medications:  · aspirin  · certain antibiotics like clarithromycin, telithromycin, and rifampin  · certain antiviral medicines for HIV or AIDS like atazanavir, indinavir, nelfinavir, ritonavir, and saquinavir  · certain medicines for cholesterol like lovastatin and simvastatin  · certain medicines for fungal infections like ketoconazole and voriconazole  · certain medicines for seizures like carbamazepine, phenobarbital, and phenytoin  · digoxin  · lovastatin  · narcotic medicines for pain  · nefazodone  This list may not describe all possible interactions. Give your health care provider a list of all the medicines, herbs, non-prescription drugs, or dietary supplements you use. Also tell them if you smoke, drink alcohol, or use illegal drugs. Some items may interact with your medicine.  What should I watch for while using this medicine?  Visit your healthcare professional for regular checks on your progress. Your condition will be monitored carefully while you are receiving this medicine. It is important not to miss any appointments.  Notify your doctor or health care professional and seek emergency treatment if you develop breathing problems; changes in vision; chest pain; severe, sudden headache; pain, swelling, warmth in the leg; trouble speaking; sudden numbness or weakness of the face, arm, or leg. These can be signs that your condition has gotten worse.  If you are going to have surgery or dental work, tell your doctor or health care professional that you are taking this medicine.  You should take aspirin every day with this medicine. Do not take more than 100 mg each day. Talk to your healthcare professional if you have questions.  What side effects may I notice from receiving this medicine?  Side effects that you should report to your doctor or health care professional as soon as possible:  · allergic reactions  like skin rash, itching or hives, swelling of the face, lips, or tongue  · breathing problems  · fast or irregular heartbeat  · feeling faint or light-headed, falls  · signs and symptoms of bleeding such as bloody or black, tarry stools; red or dark-brown urine; spitting up blood or brown material that looks like coffee grounds; red spots on the skin; unusual bruising or bleeding from the eye, gums, or nose  Side effects that usually do not require medical attention (report to your doctor or health care professional if they continue or are bothersome):  · breast enlargement in both males and females  · diarrhea  · dizziness  · headache  · tiredness  · upset stomach  This list may not describe all possible side effects. Call your doctor for medical advice about side effects. You may report side effects to FDA at 3-943-FDA-4518.  Where should I keep my medicine?  Keep out of the reach of children.  Store at room temperature of 59 to 86 degrees F (15 to 30 degrees C). Throw away any unused medicine after the expiration date.  NOTE: This sheet is a summary. It may not cover all possible information. If you have questions about this medicine, talk to your doctor, pharmacist, or health care provider.  © 2020 Elsevier/Gold Standard (2019-09-11 11:47:22)    Aspirin, ASA oral tablets  What is this medicine?  ASPIRIN (AS pir in) is a pain reliever. It is used to treat mild pain and fever. This medicine is also used as directed by a doctor to prevent and to treat heart attacks, to prevent strokes and blood clots, and to treat arthritis or inflammation.  This medicine may be used for other purposes; ask your health care provider or pharmacist if you have questions.  COMMON BRAND NAME(S): Aspir-Low, Aspir-Vivian, Aspirtab, Stanley Advanced Aspirin, Stanley Aspirin, Stanley Aspirin Extra Strength, Stanley Aspirin Plus, Stanley Extra Strength, Stanley Extra Strength Plus, Stanley Genuine Aspirin, Stanley Womens Aspirin, Bufferin, Bufferin Extra  Strength, Bufferin Low Dose  What should I tell my health care provider before I take this medicine?  They need to know if you have any of these conditions:  · anemia  · asthma  · bleeding problems  · child with chickenpox, the flu, or other viral infection  · diabetes  · gout  · if you frequently drink alcohol containing drinks  · kidney disease  · liver disease  · low level of vitamin K  · lupus  · smoke tobacco  · stomach ulcers or other problems  · an unusual or allergic reaction to aspirin, tartrazine dye, other medicines, dyes, or preservatives  · pregnant or trying to get pregnant  · breast-feeding  How should I use this medicine?  Take this medicine by mouth with a glass of water. Follow the directions on the package or prescription label. You can take this medicine with or without food. If it upsets your stomach, take it with food. Do not take your medicine more often than directed.  Talk to your pediatrician regarding the use of this medicine in children. While this drug may be prescribed for children as young as 12 years of age for selected conditions, precautions do apply. Children and teenagers should not use this medicine to treat chicken pox or flu symptoms unless directed by a doctor.  Patients over 65 years old may have a stronger reaction and need a smaller dose.  Overdosage: If you think you have taken too much of this medicine contact a poison control center or emergency room at once.  NOTE: This medicine is only for you. Do not share this medicine with others.  What if I miss a dose?  If you are taking this medicine on a regular schedule and miss a dose, take it as soon as you can. If it is almost time for your next dose, take only that dose. Do not take double or extra doses.  What may interact with this medicine?  Do not take this medicine with any of the following medications:  · cidofovir  · ketorolac  · probenecid  This medicine may also interact with the following  medications:  · alcohol  · alendronate  · bismuth subsalicylate  · flavocoxid  · herbal supplements like feverfew, garlic, chance, ginkgo biloba, horse chestnut  · medicines for diabetes or glaucoma like acetazolamide, methazolamide  · medicines for gout  · medicines that treat or prevent blood clots like enoxaparin, heparin, ticlopidine, warfarin  · other aspirin and aspirin-like medicines  · NSAIDs, medicines for pain and inflammation, like ibuprofen or naproxen  · pemetrexed  · sulfinpyrazone  · varicella live vaccine  This list may not describe all possible interactions. Give your health care provider a list of all the medicines, herbs, non-prescription drugs, or dietary supplements you use. Also tell them if you smoke, drink alcohol, or use illegal drugs. Some items may interact with your medicine.  What should I watch for while using this medicine?  If you are treating yourself for pain, tell your doctor or health care professional if the pain lasts more than 10 days, if it gets worse, or if there is a new or different kind of pain. Tell your doctor if you see redness or swelling. Also, check with your doctor if you have a fever that lasts for more than 3 days. Only take this medicine to prevent heart attacks or blood clotting if prescribed by your doctor or health care professional.  Do not take aspirin or aspirin-like medicines with this medicine. Too much aspirin can be dangerous. Always read the labels carefully.  This medicine can irritate your stomach or cause bleeding problems. Do not smoke cigarettes or drink alcohol while taking this medicine. Do not lie down for 30 minutes after taking this medicine to prevent irritation to your throat.  If you are scheduled for any medical or dental procedure, tell your healthcare provider that you are taking this medicine. You may need to stop taking this medicine before the procedure.  This medicine may be used to treat migraines. If you take migraine medicines  for 10 or more days a month, your migraines may get worse. Keep a diary of headache days and medicine use. Contact your healthcare professional if your migraine attacks occur more frequently.  What side effects may I notice from receiving this medicine?  Side effects that you should report to your doctor or health care professional as soon as possible:  · allergic reactions like skin rash, itching or hives, swelling of the face, lips, or tongue  · breathing problems  · changes in hearing, ringing in the ears  · confusion  · general ill feeling or flu-like symptoms  · pain on swallowing  · redness, blistering, peeling or loosening of the skin, including inside the mouth or nose  · signs and symptoms of bleeding such as bloody or black, tarry stools; red or dark-brown urine; spitting up blood or brown material that looks like coffee grounds; red spots on the skin; unusual bruising or bleeding from the eye, gums, or nose  · trouble passing urine or change in the amount of urine  · unusually weak or tired  · yellowing of the eyes or skin  Side effects that usually do not require medical attention (report to your doctor or health care professional if they continue or are bothersome):  · diarrhea or constipation  · headache  · nausea, vomiting  · stomach gas, heartburn  This list may not describe all possible side effects. Call your doctor for medical advice about side effects. You may report side effects to FDA at 9-232-FDA-3388.  Where should I keep my medicine?  Keep out of the reach of children.  Store at room temperature between 15 and 30 degrees C (59 and 86 degrees F). Protect from heat and moisture. Do not use this medicine if it has a strong vinegar smell. Throw away any unused medicine after the expiration date.  NOTE: This sheet is a summary. It may not cover all possible information. If you have questions about this medicine, talk to your doctor, pharmacist, or health care provider.  © 2020 [employer]  Standard (2018-01-30 10:42:13)

## 2020-09-14 NOTE — DIETARY
Nutrition Services: Day 2 of admit. Tan Moore is a 65 y.o. male with admitting DX of STEMI (ST elevation myocardial infarction).  Consult received for 14-23 lb weight loss x 1 month, poor appetite, heart healthy education.     UBW is 155-160 lbs per patient and he last recalls being that weight about one month ago. On Saturday, patient reports weighing 143 lbs. During the past month when patient was losing weight, patient reports eating 75% of usual PO intake. Patient reports appetite is good at this time. Patient attributes weight loss to ankle injury and stated he has lost muscle since being injured. Right ankle appears atrophied. Other physical findings are difficult to distinguish between muscle loss and age-related sarcopenia.     Pt given heart healthy nutrition education and provided information from [facility]. Pt encouraged to limit sodium and fat. This RD identified food items to avoid and suggested more fruits, vegetable, lean protein, and whole grains.     Assessment:  Ht: 175.3 cm, Wt 9/14: 61 kg (134 lb 7.7 oz) via bed scale, BMI: 19.86 - normal  Diet/Intake: cardiac, 2 g Na - Per chart pt PO % x 3 meals    Evaluation:   1. PMHx: HTN, HLD, CAD  2. 12 - 17 lb weight loss x 1 month, 8-11% decrease x 1 month per patient verbalization (severe)  3. Labs: sodium 131  4. Meds: bowel protocol  5. Last BM: 9/13    Malnutrition Risk: Severe acute injury malnutrition related to right ankle fracture as evidenced by consuming 75% of baseline PO intake x 1 month and severe weight loss per patient verbalization.    Recommendations/Plan:  1. Encourage intake of meals.  2. Document intake of all meals as % taken in ADL's to provide interdisciplinary communication across all shifts.   3. Monitor weight.  4. Nutrition rep will continue to see patient for ongoing meal and snack preferences.  5. Obtain supplement order per RD as needed.

## 2020-09-14 NOTE — PROGRESS NOTES
Printed Brilinta prescription with 11 refills [facility].   [facility] with whom [facility], encouraged him to follow up with him in the next 1-2 weeks.    Fay Bravo PA-C  9/14/2020

## 2020-09-14 NOTE — PROGRESS NOTES
2 RN Skin Check    2 RN skin check complete with Jenni.   Devices in place: N/A  Right lower extremity cast in place   No Confirmed or potential pressure ulcers found  Wound consult placed No.  The following interventions in place Pillows.  Healed lumbar spine surgical incision.   Red abrasions on upper left thigh/groin.   Scab on right knee above cast.  Generalized dry/flaky skin.

## 2020-10-16 ENCOUNTER — OFFICE VISIT (OUTPATIENT)
Dept: CARDIOLOGY | Facility: MEDICAL CENTER | Age: 65
End: 2020-10-16
Payer: COMMERCIAL

## 2020-10-16 ENCOUNTER — TELEPHONE (OUTPATIENT)
Dept: CARDIOLOGY | Facility: MEDICAL CENTER | Age: 65
End: 2020-10-16

## 2020-10-16 VITALS
DIASTOLIC BLOOD PRESSURE: 68 MMHG | BODY MASS INDEX: 20.99 KG/M2 | HEART RATE: 96 BPM | HEIGHT: 70 IN | WEIGHT: 146.6 LBS | OXYGEN SATURATION: 98 % | SYSTOLIC BLOOD PRESSURE: 124 MMHG

## 2020-10-16 DIAGNOSIS — I21.02 ST ELEVATION MYOCARDIAL INFARCTION INVOLVING LEFT ANTERIOR DESCENDING (LAD) CORONARY ARTERY (HCC): ICD-10-CM

## 2020-10-16 DIAGNOSIS — I25.9 ISCHEMIC HEART DISEASE DUE TO CORONARY ARTERY OBSTRUCTION (HCC): ICD-10-CM

## 2020-10-16 DIAGNOSIS — I10 ESSENTIAL HYPERTENSION: ICD-10-CM

## 2020-10-16 DIAGNOSIS — I24.0 ISCHEMIC HEART DISEASE DUE TO CORONARY ARTERY OBSTRUCTION (HCC): ICD-10-CM

## 2020-10-16 DIAGNOSIS — I21.3 ST ELEVATION MYOCARDIAL INFARCTION (STEMI), UNSPECIFIED ARTERY (HCC): ICD-10-CM

## 2020-10-16 PROBLEM — R53.83 FATIGUE: Status: ACTIVE | Noted: 2018-08-16

## 2020-10-16 PROBLEM — I25.10 ARTERIOSCLEROSIS OF CORONARY ARTERY: Status: ACTIVE | Noted: 2018-08-16

## 2020-10-16 PROBLEM — F17.200 TOBACCO DEPENDENCE: Status: ACTIVE | Noted: 2018-08-16

## 2020-10-16 PROCEDURE — 99205 OFFICE O/P NEW HI 60 MIN: CPT | Performed by: STUDENT IN AN ORGANIZED HEALTH CARE EDUCATION/TRAINING PROGRAM

## 2020-10-16 RX ORDER — SPIRONOLACTONE 25 MG/1
25 TABLET ORAL DAILY
Qty: 30 TAB | Refills: 3 | Status: SHIPPED | OUTPATIENT
Start: 2020-10-16 | End: 2021-03-15 | Stop reason: SDUPTHER

## 2020-10-16 ASSESSMENT — ENCOUNTER SYMPTOMS
IRREGULAR HEARTBEAT: 0
NAUSEA: 0
WEAKNESS: 0
NIGHT SWEATS: 0
DYSPNEA ON EXERTION: 0
FOCAL WEAKNESS: 0
PND: 0
DIZZINESS: 0
DIARRHEA: 0
SHORTNESS OF BREATH: 0
NEAR-SYNCOPE: 0
ABDOMINAL PAIN: 0
PALPITATIONS: 0
WHEEZING: 0
COUGH: 0
ORTHOPNEA: 0
SYNCOPE: 0
FEVER: 0
VOMITING: 0

## 2020-10-16 ASSESSMENT — FIBROSIS 4 INDEX: FIB4 SCORE: 0.6

## 2020-10-16 NOTE — LETTER
"     [facility]   [address], [address]  [place] [address]  Phone: [phone]  Fax: [phone]              Tan Moore  1955    Encounter Date: 10/16/2020    Burt Ochoa M.D.          PROGRESS NOTE:      Cardiology Initial Consultation Note    Date of note:    10/15/2020    Primary Care Provider: REEMA Carbajal  Referring Provider: No ref. provider found     Patient Name: Tan Moore     YOB: 1955  MRN:              [id number]    Chief Complaint: Status post STEMI with PCI to LAD    History of Present Illness: Mr. Tan Moore is a 65 y.o. male whose current medical problems include STEMI status post PCI to LAD (September 12, 2020), and essential hypertension who is here for cardiac consultation to establish care.    The patient was recently hospitalized in September 2020 for STEMI.  He received a stent to proximal LAD on September 12, 2020.  This artery was previously stented August 2019.      Today, the patient reports feeling well.  He has notified me that he has stopped taking all his cardiac medications after [facility] feeling [facility].  Per the patient, he reportedly was given what sounds like 2 units of blood, underwent endoscopy, and was discharged.  We do not have those records, and we are actively retrieving those records.  Per the patient, he did not have any bleeding, hematemesis, or hematochezia prior to going the ER.  Denies any current bleeding as well.  He denies any lightheadedness or syncope/presyncopal episodes prior to the ER visit.  [facility] states \"patient to restart [facility]ic[facility]relor.\"  We discussed the importance of him being on both aspirin and ticagrelor as he recently had STEMI with in-stent thrombosis.  The patient understands and will resume all his cardiac medications.    He denies any chest pain or shortness of breath.  Denies any " orthopnea, PND, or leg swelling.    Cardiovascular Risk Factors:  1. Smoking status: Coronary smoker  2. Type II Diabetes Mellitus:    Lab Results   Component Value Date/Time    HBA1C 5.9 (H) 08/23/2019 04:00 AM     3. Hypertension:   4. Dyslipidemia:    Cholesterol,Tot   Date Value Ref Range Status   08/22/2019 147 100 - 199 mg/dL Final     LDL   Date Value Ref Range Status   08/22/2019 91 <100 mg/dL Final     HDL   Date Value Ref Range Status   08/22/2019 34 (A) >=40 mg/dL Final     Triglycerides   Date Value Ref Range Status   08/22/2019 111 0 - 149 mg/dL Final     5. Family history of early Coronary Artery Disease in a first degree relative (Male less than 55 years of age; Female less than 65 years of age): None  6.  Obesity and/or Metabolic Syndrome: BMI 21.0  7. Sedentary lifestyle: Yes    Review of Systems   Constitution: Negative for fever, malaise/fatigue and night sweats.   Cardiovascular: Negative for chest pain, dyspnea on exertion, irregular heartbeat, leg swelling, near-syncope, orthopnea, palpitations, paroxysmal nocturnal dyspnea and syncope.   Respiratory: Negative for cough, shortness of breath and wheezing.    Gastrointestinal: Negative for abdominal pain, diarrhea, nausea and vomiting.   Neurological: Negative for dizziness, focal weakness and weakness.         All other systems reviewed and discussed using a comprehensive questionnaire and are negative.       Past Medical History:   Diagnosis Date   • Anxiety    • Arthritis    • Bilateral knee pain     CHRONIC   • Chronic low back pain    • Chronic neck pain    • Depression    • Hypertension          Past Surgical History:   Procedure Laterality Date   • LAMINOTOMY  1988    L4-L5   • ARTHROPLASTY      b/l knee surgery         Current Outpatient Medications   Medication Sig Dispense Refill   • spironolactone (ALDACTONE) 25 MG Tab Take 1 Tab by mouth every day. 30 Tab 3   • buprenorphine-naloxone (SUBOXONE) 8-2 MG SL Tab Place 1 Tab under tongue 2  Times a Day. Pain     • amitriptyline (ELAVIL) 50 MG Tab Take 50 mg by mouth every evening.     • ticagrelor (BRILINTA) 90 MG Tab tablet Take 1 Tab by mouth 2 Times a Day. (Patient not taking: Reported on 10/16/2020) 60 Tab 1   • aspirin EC 81 MG EC tablet Take 1 Tab by mouth every day. (Patient not taking: Reported on 10/16/2020) 30 Tab 0   • atorvastatin (LIPITOR) 80 MG tablet Take 1 Tab by mouth every evening. (Patient not taking: Reported on 10/16/2020) 30 Tab 0   • lisinopril (PRINIVIL) 5 MG Tab Take 1 Tab by mouth every 12 hours. (Patient not taking: Reported on 10/16/2020) 30 Tab 0   • metoprolol SR (TOPROL XL) 25 MG TABLET SR 24 HR Take 1 Tab by mouth every day. (Patient not taking: Reported on 10/16/2020) 30 Tab 0     No current facility-administered medications for this visit.          No Known Allergies      No family history on file.      Social History     Socioeconomic History   • Marital status: Single     Spouse name: Not on file   • Number of children: Not on file   • Years of education: Not on file   • Highest education level: Not on file   Occupational History   • Not on file   Social Needs   • Financial resource strain: Not on file   • Food insecurity     Worry: Not on file     Inability: Not on file   • Transportation needs     Medical: Not on file     Non-medical: Not on file   Tobacco Use   • Smoking status: Current Every Day Smoker     Packs/day: 1.00     Years: 46.00     Pack years: 46.00     Types: Cigarettes   • Smokeless tobacco: Never Used   Substance and Sexual Activity   • Alcohol use: Never     Frequency: Never   • Drug use: Not on file   • Sexual activity: Not on file   Lifestyle   • Physical activity     Days per week: Not on file     Minutes per session: Not on file   • Stress: Not on file   Relationships   • Social connections     Talks on phone: Not on file     Gets together: Not on file     Attends Orthodox service: Not on file     Active member of club or organization: Not on  "file     Attends meetings of clubs or organizations: Not on file     Relationship status: Not on file   • Intimate partner violence     Fear of current or ex partner: Not on file     Emotionally abused: Not on file     Physically abused: Not on file     Forced sexual activity: Not on file   Other Topics Concern   • Not on file   Social History Narrative   • Not on file         Physical Exam:  Ambulatory Vitals  /68 (BP Location: Left arm, Patient Position: Sitting, BP Cuff Size: Adult)   Pulse 96   Ht 1.778 m (5' 10\")   Wt 66.5 kg (146 lb 9.6 oz)   SpO2 98%    Oxygen Therapy:  Pulse Oximetry: 98 %  BP Readings from Last 4 Encounters:   10/16/20 124/68   09/14/20 114/73   08/23/19 104/65   06/26/09 110/70       Weight/BMI: Body mass index is 21.03 kg/m².  Wt Readings from Last 4 Encounters:   10/16/20 66.5 kg (146 lb 9.6 oz)   09/12/20 61 kg (134 lb 7.7 oz)   08/22/19 72 kg (158 lb 11.7 oz)   06/26/09 79.4 kg (175 lb)         General: Well appearing and in no apparent distress  Eyes: nl conjunctiva, no icteric sclera  ENT: wearing a mask, normal external appearance of ears  Neck: no visible JVP,  no carotid bruits  Lungs: normal respiratory effort, CTAB  Heart: RRR, no murmurs, no rubs or gallops,  no edema bilateral lower extremities. No LV/RV heave on cardiac palpatation. + bilateral radial pulses.  + bilateral dp pulses.   Abdomen: soft, non tender, non distended, no masses, normal bowel sounds.  No HSM.  Extremities/MSK: no clubbing, no cyanosis  Neurological: No focal sensory deficits  Psychiatric: Appropriate affect, A/O x 3, intact judgement and insight  Skin: Warm extremities      Lab Data Review:  Lab Results   Component Value Date/Time    CHOLSTRLTOT 147 08/22/2019 04:42 AM    LDL 91 08/22/2019 04:42 AM    HDL 34 (A) 08/22/2019 04:42 AM    TRIGLYCERIDE 111 08/22/2019 04:42 AM       Lab Results   Component Value Date/Time    SODIUM 131 (L) 09/13/2020 04:20 AM    POTASSIUM 4.4 09/13/2020 04:20 AM    " CHLORIDE 103 09/13/2020 04:20 AM    CO2 20 09/13/2020 04:20 AM    GLUCOSE 80 09/13/2020 04:20 AM    BUN 15 09/13/2020 04:20 AM    CREATININE 1.02 09/13/2020 04:20 AM     Lab Results   Component Value Date/Time    ALKPHOSPHAT 72 09/12/2020 10:10 AM    ASTSGOT 9 (L) 09/12/2020 10:10 AM    ALTSGPT 15 09/12/2020 10:10 AM    TBILIRUBIN <0.2 09/12/2020 10:10 AM      Lab Results   Component Value Date/Time    WBC 7.7 09/13/2020 04:20 AM     Lab Results   Component Value Date/Time    HBA1C 5.9 (H) 08/23/2019 04:00 AM         Cardiac Imaging and Procedures Review:    EKG dated September 13, 2020: My personal interpretation is sinus bradycardia, low voltage in the limb leads, nonspecific ST changes    Echo dated 9/13/2020:   Normal left ventricular systolic function.  Left ventricular ejection fraction is visually estimated to be 60%.  Mild hypokinesis of the mid anteroseptal wall.  Indeterminate diastolic function.  Normal right ventricular size and systolic function.  Biatrial enlargement  No significant valvular regurgitation or stenosis.   Normal estimated right atrial pressure  Unable to estimate pulmonary artery pressure due to an inadequate   tricuspid regurgitant jet.  Compared to the images of the prior study done 8/22/2019: The mid   anteroseptal wall motion abnormality is new.     CARDIAC CATHETERIZATION CONCLUSIONS by Jean Judd (09/12/20)  1.  100% thrombotically occluded proximal LAD stent  2.  Mild nonobstructive CAD otherwise  3.  LVEF 30% prior to intervention  4.  Successful IVIU guided PCI culprit LAD (3.0 x 34 mm [place] OSCAR), excellent result  (study result reviewed)     CARDIAC CATHETERIZATION CONCLUSIONS (08/21/19)  1.  Coronary artery disease with occluded proximal and mid left anterior   descending artery.  2.  Successful thrombectomy/percutaneous transluminal coronary angioplasty,  stent placement of the proximal left anterior descending artery with 2.5x20 mm   Synergy drug-eluting stent.  3.   Successful percutaneous transluminal coronary angioplasty,stent placement   of the mid left anterior descending artery with 2.25x28 mm Synergy   drug-eluting stent.  4.  Reduced left ventricular systolic function with ejection fraction of 30%.  5.  Elevated left ventricular end-diastolic pressure.  (study result reviewed)        Assessment & Plan     1. ST elevation myocardial infarction involving left anterior descending (LAD) coronary artery (HCC)  EC-ECHOCARDIOGRAM COMPLETE W/O CONT   2. Essential hypertension  spironolactone (ALDACTONE) 25 MG Tab   3. Ischemic heart disease due to coronary artery obstruction (HCC)  EC-ECHOCARDIOGRAM COMPLETE W/O CONT    spironolactone (ALDACTONE) 25 MG Tab   4. ST elevation myocardial infarction (STEMI), unspecified artery (HCC)  spironolactone (ALDACTONE) 25 MG Tab         Shared Medical Decision Making:    Ischemic heart disease due to cardioprotection  CAD with STEMI status post PCI to LAD September 2020  -Continue aspirin 81 mg daily and ticagrelor 90 mg twice daily  -Continue high intensity statin (atorvastatin 80 mg daily)  -Continue metoprolol 25 mg daily and lisinopril 5 mg daily  -Continue spironolactone 25mg daily  -Repeat echocardiogram prior to next visit to assess LVEF and wall motion abnormality  -[facility].  Patient is given ER warning signs for chest pain as well as bleeding.  -I will review records and if he indeed have significant bleeding, we can consider changing ticagrelor to clopidogrel.  From clinical history today, it does not appear that patient has significant bleeding.  It is imperative that the patient stays on dual antiplatelet therapy for 1 year to prevent another STEMI.  The patient reports understanding and will resume his cardiac medications.    Essential hypertension  Pressure within normal limits  -Continue metoprolol 25 mg daily, lisinopril 5 mg daily, spironolactone 25 mg daily    All of the  patient's excellent questions were answered to the best of my knowledge and to his satisfaction.  It was a pleasure seeing Mr. Tan Moore in my clinic today. Return in about 3 months (around 1/16/2021). Patient is aware to call the cardiology clinic with any questions or concerns.      Burt Ochoa MD  [facility], [facility]  [address] [address]  Phone: [phone]  Fax: [phone]           Abhilash Guthrie A.P.R.NNinfa  [address] [facility] [address]  Via Fax: [phone]

## 2020-10-16 NOTE — PATIENT INSTRUCTIONS
Please take ALL the cardiac medications you were prescribed, especially aspirin and ticagrelor - see the print out of all the medications.  Please call us if you are missing any of the medications.     Schedule: echocardiogram.

## 2020-10-16 NOTE — PROGRESS NOTES
"    Cardiology Initial Consultation Note    Date of note:    10/15/2020    Primary Care Provider: REEMA Carbajal  Referring Provider:  REEMA Carbajal    Patient Name: Tan Moore     YOB: 1955  MRN:              [id number]    Chief Complaint: Status post STEMI with PCI to LAD    History of Present Illness: Mr. Tan Moore is a 65 y.o. male whose current medical problems include STEMI status post PCI to LAD (September 12, 2020), and essential hypertension who is here for cardiac consultation to establish care.    The patient was recently hospitalized in September 2020 for STEMI.  He received a stent to proximal LAD on September 12, 2020.  This artery was previously stented August 2019.      Today, the patient reports feeling well.  He has notified me that he has stopped taking all his cardiac medications [facility].  Per the patient, he reportedly was given what sounds like 2 units of blood, underwent endoscopy, and was discharged.  We do not have those records, and we are actively retrieving those records.  Per the patient, he did not have any bleeding, hematemesis, or hematochezia prior to going the ER.  Denies any current bleeding as well.  He denies any lightheadedness or syncope/presyncopal episodes prior to the ER visit.  [facility] states \"patient to restart ticagrelor.\"  We discussed the importance of him being on both aspirin and ticagrelor as he recently had STEMI with in-stent thrombosis.  The patient understands and will resume all his cardiac medications.    He denies any chest pain or shortness of breath.  Denies any orthopnea, PND, or leg swelling.    Cardiovascular Risk Factors:  1. Smoking status: Coronary smoker  2. Type II Diabetes Mellitus:    Lab Results   Component Value Date/Time    HBA1C 5.9 (H) 08/23/2019 04:00 AM     3. Hypertension:   4. Dyslipidemia:    "   Cholesterol,Tot   Date Value Ref Range Status   08/22/2019 147 100 - 199 mg/dL Final     LDL   Date Value Ref Range Status   08/22/2019 91 <100 mg/dL Final     HDL   Date Value Ref Range Status   08/22/2019 34 (A) >=40 mg/dL Final     Triglycerides   Date Value Ref Range Status   08/22/2019 111 0 - 149 mg/dL Final     5. Family history of early Coronary Artery Disease in a first degree relative (Male less than 55 years of age; Female less than 65 years of age): None  6.  Obesity and/or Metabolic Syndrome: BMI 21.0  7. Sedentary lifestyle: Yes    Review of Systems   Constitution: Negative for fever, malaise/fatigue and night sweats.   Cardiovascular: Negative for chest pain, dyspnea on exertion, irregular heartbeat, leg swelling, near-syncope, orthopnea, palpitations, paroxysmal nocturnal dyspnea and syncope.   Respiratory: Negative for cough, shortness of breath and wheezing.    Gastrointestinal: Negative for abdominal pain, diarrhea, nausea and vomiting.   Neurological: Negative for dizziness, focal weakness and weakness.         All other systems reviewed and discussed using a comprehensive questionnaire and are negative.       Past Medical History:   Diagnosis Date   • Anxiety    • Arthritis    • Bilateral knee pain     CHRONIC   • Chronic low back pain    • Chronic neck pain    • Depression    • Hypertension          Past Surgical History:   Procedure Laterality Date   • LAMINOTOMY  1988    L4-L5   • ARTHROPLASTY      b/l knee surgery         Current Outpatient Medications   Medication Sig Dispense Refill   • spironolactone (ALDACTONE) 25 MG Tab Take 1 Tab by mouth every day. 30 Tab 3   • buprenorphine-naloxone (SUBOXONE) 8-2 MG SL Tab Place 1 Tab under tongue 2 Times a Day. Pain     • amitriptyline (ELAVIL) 50 MG Tab Take 50 mg by mouth every evening.     • ticagrelor (BRILINTA) 90 MG Tab tablet Take 1 Tab by mouth 2 Times a Day. (Patient not taking: Reported on 10/16/2020) 60 Tab 1   • aspirin EC 81 MG EC  tablet Take 1 Tab by mouth every day. (Patient not taking: Reported on 10/16/2020) 30 Tab 0   • atorvastatin (LIPITOR) 80 MG tablet Take 1 Tab by mouth every evening. (Patient not taking: Reported on 10/16/2020) 30 Tab 0   • lisinopril (PRINIVIL) 5 MG Tab Take 1 Tab by mouth every 12 hours. (Patient not taking: Reported on 10/16/2020) 30 Tab 0   • metoprolol SR (TOPROL XL) 25 MG TABLET SR 24 HR Take 1 Tab by mouth every day. (Patient not taking: Reported on 10/16/2020) 30 Tab 0     No current facility-administered medications for this visit.          No Known Allergies      No family history on file.      Social History     Socioeconomic History   • Marital status: Single     Spouse name: Not on file   • Number of children: Not on file   • Years of education: Not on file   • Highest education level: Not on file   Occupational History   • Not on file   Social Needs   • Financial resource strain: Not on file   • Food insecurity     Worry: Not on file     Inability: Not on file   • Transportation needs     Medical: Not on file     Non-medical: Not on file   Tobacco Use   • Smoking status: Current Every Day Smoker     Packs/day: 1.00     Years: 46.00     Pack years: 46.00     Types: Cigarettes   • Smokeless tobacco: Never Used   Substance and Sexual Activity   • Alcohol use: Never     Frequency: Never   • Drug use: Not on file   • Sexual activity: Not on file   Lifestyle   • Physical activity     Days per week: Not on file     Minutes per session: Not on file   • Stress: Not on file   Relationships   • Social connections     Talks on phone: Not on file     Gets together: Not on file     Attends Holiness service: Not on file     Active member of club or organization: Not on file     Attends meetings of clubs or organizations: Not on file     Relationship status: Not on file   • Intimate partner violence     Fear of current or ex partner: Not on file     Emotionally abused: Not on file     Physically abused: Not on file   "    Forced sexual activity: Not on file   Other Topics Concern   • Not on file   Social History Narrative   • Not on file         Physical Exam:  Ambulatory Vitals  /68 (BP Location: Left arm, Patient Position: Sitting, BP Cuff Size: Adult)   Pulse 96   Ht 1.778 m (5' 10\")   Wt 66.5 kg (146 lb 9.6 oz)   SpO2 98%    Oxygen Therapy:  Pulse Oximetry: 98 %  BP Readings from Last 4 Encounters:   10/16/20 124/68   09/14/20 114/73   08/23/19 104/65   06/26/09 110/70       Weight/BMI: Body mass index is 21.03 kg/m².  Wt Readings from Last 4 Encounters:   10/16/20 66.5 kg (146 lb 9.6 oz)   09/12/20 61 kg (134 lb 7.7 oz)   08/22/19 72 kg (158 lb 11.7 oz)   06/26/09 79.4 kg (175 lb)         General: Well appearing and in no apparent distress  Eyes: nl conjunctiva, no icteric sclera  ENT: wearing a mask, normal external appearance of ears  Neck: no visible JVP,  no carotid bruits  Lungs: normal respiratory effort, CTAB  Heart: RRR, no murmurs, no rubs or gallops,  no edema bilateral lower extremities. No LV/RV heave on cardiac palpatation. + bilateral radial pulses.  + bilateral dp pulses.   Abdomen: soft, non tender, non distended, no masses, normal bowel sounds.  No HSM.  Extremities/MSK: no clubbing, no cyanosis  Neurological: No focal sensory deficits  Psychiatric: Appropriate affect, A/O x 3, intact judgement and insight  Skin: Warm extremities      Lab Data Review:  Lab Results   Component Value Date/Time    CHOLSTRLTOT 147 08/22/2019 04:42 AM    LDL 91 08/22/2019 04:42 AM    HDL 34 (A) 08/22/2019 04:42 AM    TRIGLYCERIDE 111 08/22/2019 04:42 AM       Lab Results   Component Value Date/Time    SODIUM 131 (L) 09/13/2020 04:20 AM    POTASSIUM 4.4 09/13/2020 04:20 AM    CHLORIDE 103 09/13/2020 04:20 AM    CO2 20 09/13/2020 04:20 AM    GLUCOSE 80 09/13/2020 04:20 AM    BUN 15 09/13/2020 04:20 AM    CREATININE 1.02 09/13/2020 04:20 AM     Lab Results   Component Value Date/Time    ALKPHOSPHAT 72 09/12/2020 10:10 AM "    ASTSGOT 9 (L) 09/12/2020 10:10 AM    ALTSGPT 15 09/12/2020 10:10 AM    TBILIRUBIN <0.2 09/12/2020 10:10 AM      Lab Results   Component Value Date/Time    WBC 7.7 09/13/2020 04:20 AM     Lab Results   Component Value Date/Time    HBA1C 5.9 (H) 08/23/2019 04:00 AM         Cardiac Imaging and Procedures Review:    EKG dated September 13, 2020: My personal interpretation is sinus bradycardia, low voltage in the limb leads, nonspecific ST changes    Echo dated 9/13/2020:   Normal left ventricular systolic function.  Left ventricular ejection fraction is visually estimated to be 60%.  Mild hypokinesis of the mid anteroseptal wall.  Indeterminate diastolic function.  Normal right ventricular size and systolic function.  Biatrial enlargement  No significant valvular regurgitation or stenosis.   Normal estimated right atrial pressure  Unable to estimate pulmonary artery pressure due to an inadequate   tricuspid regurgitant jet.  Compared to the images of the prior study done 8/22/2019: The mid   anteroseptal wall motion abnormality is new.     CARDIAC CATHETERIZATION CONCLUSIONS by Jean Judd (09/12/20)  1.  100% thrombotically occluded proximal LAD stent  2.  Mild nonobstructive CAD otherwise  3.  LVEF 30% prior to intervention  4.  Successful IVIU guided PCI culprit LAD (3.0 x 34 mm [place] OSCAR), excellent result  (study result reviewed)     CARDIAC CATHETERIZATION CONCLUSIONS (08/21/19)  1.  Coronary artery disease with occluded proximal and mid left anterior   descending artery.  2.  Successful thrombectomy/percutaneous transluminal coronary angioplasty,  stent placement of the proximal left anterior descending artery with 2.5x20 mm   Synergy drug-eluting stent.  3.  Successful percutaneous transluminal coronary angioplasty,stent placement   of the mid left anterior descending artery with 2.25x28 mm Synergy   drug-eluting stent.  4.  Reduced left ventricular systolic function with ejection fraction of 30%.  5.   Elevated left ventricular end-diastolic pressure.  (study result reviewed)        Assessment & Plan     1. ST elevation myocardial infarction involving left anterior descending (LAD) coronary artery (HCC)  EC-ECHOCARDIOGRAM COMPLETE W/O CONT   2. Essential hypertension  spironolactone (ALDACTONE) 25 MG Tab   3. Ischemic heart disease due to coronary artery obstruction (HCC)  EC-ECHOCARDIOGRAM COMPLETE W/O CONT    spironolactone (ALDACTONE) 25 MG Tab   4. ST elevation myocardial infarction (STEMI), unspecified artery (HCC)  spironolactone (ALDACTONE) 25 MG Tab         Shared Medical Decision Making:    Ischemic heart disease due to cardioprotection  CAD with STEMI status post PCI to LAD September 2020  -Continue aspirin 81 mg daily and ticagrelor 90 mg twice daily  -Continue high intensity statin (atorvastatin 80 mg daily)  -Continue metoprolol 25 mg daily and lisinopril 5 mg daily  -Continue spironolactone 25mg daily  -Repeat echocardiogram prior to next visit to assess LVEF and wall motion abnormality  -[facility].  Patient is given ER warning signs for chest pain as well as bleeding.  -I will review records and if he indeed have significant bleeding, we can consider changing ticagrelor to clopidogrel.  From clinical history today, it does not appear that patient has significant bleeding.  It is imperative that the patient stays on dual antiplatelet therapy for 1 year to prevent another STEMI.  The patient reports understanding and will resume his cardiac medications.    Essential hypertension  Pressure within normal limits  -Continue metoprolol 25 mg daily, lisinopril 5 mg daily, spironolactone 25 mg daily    All of the patient's excellent questions were answered to the best of my knowledge and to his satisfaction.  It was a pleasure seeing Mr. Tan Moore in my clinic today. Return in about 3 months (around 1/16/2021). Patient is aware to call the cardiology clinic  with any questions or concerns.      Burt Ochoa MD  [facility], [address]  [address] [address]  [place] [address]  Phone: [phone]  Fax: [phone]

## 2020-10-17 NOTE — TELEPHONE ENCOUNTER
[facility] per [facility]. Records request sent to [phone]. Completed fax confirmation received.

## 2020-10-28 ENCOUNTER — TELEPHONE (OUTPATIENT)
Dept: CARDIOLOGY | Facility: MEDICAL CENTER | Age: 65
End: 2020-10-28

## 2020-10-28 NOTE — TELEPHONE ENCOUNTER
Pt was seen in office with his list of medications.  Compared the list with MD last OV note.  Offered copy of last [facility] to [facility], he accepts.  He states no other concerns or questions at this time and is appreciative of information given.    Medication list sent to scanning for reference.

## 2021-01-12 ENCOUNTER — TELEPHONE (OUTPATIENT)
Dept: CARDIOLOGY | Facility: MEDICAL CENTER | Age: 66
End: 2021-01-12

## 2021-01-12 NOTE — TELEPHONE ENCOUNTER
Chart Prep    Spoke to patient regarding ECHO per [facility]. Patient states his primary INS coverage [facility] through the [facility] and he is not sure if they cover this type of imaging or if its something he needs to have done at the [facility]. Patient wishes to discuss this further at his visit 1/20/21 with DB. Patient states he was having trouble getting his metoprolol and Lisinopril that Dr. Ochoa wanted him to be on. This has since resolved, patient was able to get his [facility] doctor to prescribe for him. Patient tells me he MUST get all his meds through the [facility] or else it costs too much. Confimred appt date and time for 1/20/2021 at (;30am with DB.

## 2021-01-20 ENCOUNTER — OFFICE VISIT (OUTPATIENT)
Dept: CARDIOLOGY | Facility: MEDICAL CENTER | Age: 66
End: 2021-01-20
Payer: COMMERCIAL

## 2021-01-20 VITALS
HEIGHT: 70 IN | WEIGHT: 149 LBS | BODY MASS INDEX: 21.33 KG/M2 | RESPIRATION RATE: 16 BRPM | DIASTOLIC BLOOD PRESSURE: 88 MMHG | HEART RATE: 76 BPM | SYSTOLIC BLOOD PRESSURE: 140 MMHG | OXYGEN SATURATION: 99 %

## 2021-01-20 DIAGNOSIS — I24.0 ISCHEMIC HEART DISEASE DUE TO CORONARY ARTERY OBSTRUCTION (HCC): ICD-10-CM

## 2021-01-20 DIAGNOSIS — Z72.0 NICOTINE ABUSE: ICD-10-CM

## 2021-01-20 DIAGNOSIS — R73.9 HYPERGLYCEMIA: ICD-10-CM

## 2021-01-20 DIAGNOSIS — I25.2 H/O ST ELEVATION MYOCARDIAL INFARCTION: ICD-10-CM

## 2021-01-20 DIAGNOSIS — I25.9 ISCHEMIC HEART DISEASE DUE TO CORONARY ARTERY OBSTRUCTION (HCC): ICD-10-CM

## 2021-01-20 DIAGNOSIS — I21.3 ST ELEVATION MYOCARDIAL INFARCTION (STEMI), UNSPECIFIED ARTERY (HCC): ICD-10-CM

## 2021-01-20 DIAGNOSIS — I10 ESSENTIAL HYPERTENSION: ICD-10-CM

## 2021-01-20 PROCEDURE — 99213 OFFICE O/P EST LOW 20 MIN: CPT | Performed by: NURSE PRACTITIONER

## 2021-01-20 RX ORDER — TRAMADOL HYDROCHLORIDE 50 MG/1
50 TABLET ORAL EVERY 4 HOURS PRN
COMMUNITY

## 2021-01-20 RX ORDER — PNV NO.95/FERROUS FUM/FOLIC AC 28MG-0.8MG
TABLET ORAL
COMMUNITY

## 2021-01-20 RX ORDER — LISINOPRIL 5 MG/1
5 TABLET ORAL EVERY 12 HOURS
Qty: 30 TAB | Refills: 0
Start: 2021-01-20

## 2021-01-20 RX ORDER — METOPROLOL SUCCINATE 50 MG/1
25 TABLET, EXTENDED RELEASE ORAL DAILY
Qty: 30 TAB | Refills: 11
Start: 2021-01-20

## 2021-01-20 ASSESSMENT — ENCOUNTER SYMPTOMS
SPUTUM PRODUCTION: 0
FEVER: 0
ORTHOPNEA: 0
HEADACHES: 0
VOMITING: 0
WHEEZING: 0
SHORTNESS OF BREATH: 1
PALPITATIONS: 0
NAUSEA: 0
PND: 0
HEMOPTYSIS: 0
CLAUDICATION: 0
CHILLS: 0
COUGH: 0
DIZZINESS: 0

## 2021-01-20 ASSESSMENT — FIBROSIS 4 INDEX: FIB4 SCORE: 0.6

## 2021-01-20 NOTE — PROGRESS NOTES
Chief Complaint   Patient presents with   • Hypertension     F/V Dx: STEMI (ST elevation myocardial infarction) (HCC)   • Hypotension     F/V Dx: H/O ST elevation myocardial infarction       Subjective:   Tan Moore is a 65 y.o. male who presents today for  STEMI status post PCI to LAD (September 12, 2020), and essential hypertension who is here for cardiac consultation to establish care.     The patient was recently hospitalized in September 2020 for STEMI.  He received a stent to proximal LAD on September 12, 2020.  This artery was previously stented August 2019.      Patient comes in today with no complaints.  He has not obtained his echocardiogram as he was trying to determine if [facility] would [facility] if [facility].  I have recommended he follow-up with [facility] in the [facility] and have them order the echo.  He is also having difficulties obtaining lisinopril and metoprolol succinate 25 mg every evening through the [facility] also.  Blood pressure somewhat elevated today but has not been on lisinopril and metoprolol for several weeks.  He is compliant with Brilinta and understands the importance of taking this on a daily basis especially in light of the restenosis of previous stent placement.    Past Medical History:   Diagnosis Date   • Anxiety    • Arthritis    • Bilateral knee pain     CHRONIC   • Chronic low back pain    • Chronic neck pain    • Depression    • Hypertension      Past Surgical History:   Procedure Laterality Date   • LAMINOTOMY  1988    L4-L5   • ARTHROPLASTY      b/l knee surgery     History reviewed. No pertinent family history.  Social History     Socioeconomic History   • Marital status: Single     Spouse name: Not on file   • Number of children: Not on file   • Years of education: Not on file   • Highest education level: Not on file   Occupational History   • Not on file   Social Needs   • Financial resource strain: Not on file   • Food insecurity     Worry:  Not on file     Inability: Not on file   • Transportation needs     Medical: Not on file     Non-medical: Not on file   Tobacco Use   • Smoking status: Current Every Day Smoker     Packs/day: 1.00     Years: 46.00     Pack years: 46.00     Types: Cigarettes   • Smokeless tobacco: Never Used   Substance and Sexual Activity   • Alcohol use: Never     Frequency: Never   • Drug use: Never   • Sexual activity: Not on file   Lifestyle   • Physical activity     Days per week: Not on file     Minutes per session: Not on file   • Stress: Not on file   Relationships   • Social connections     Talks on phone: Not on file     Gets together: Not on file     Attends Christian service: Not on file     Active member of club or organization: Not on file     Attends meetings of clubs or organizations: Not on file     Relationship status: Not on file   • Intimate partner violence     Fear of current or ex partner: Not on file     Emotionally abused: Not on file     Physically abused: Not on file     Forced sexual activity: Not on file   Other Topics Concern   • Not on file   Social History Narrative   • Not on file     No Known Allergies  Outpatient Encounter Medications as of 1/20/2021   Medication Sig Dispense Refill   • spironolactone (ALDACTONE) 25 MG Tab Take 1 Tab by mouth every day. 30 Tab 3   • ticagrelor (BRILINTA) 90 MG Tab tablet Take 1 Tab by mouth 2 Times a Day. 60 Tab 1   • aspirin EC 81 MG EC tablet Take 1 Tab by mouth every day. 30 Tab 0   • atorvastatin (LIPITOR) 80 MG tablet Take 1 Tab by mouth every evening. 30 Tab 0   • lisinopril (PRINIVIL) 5 MG Tab Take 1 Tab by mouth every 12 hours. (Patient not taking: Reported on 10/16/2020) 30 Tab 0   • metoprolol SR (TOPROL XL) 25 MG TABLET SR 24 HR Take 1 Tab by mouth every day. (Patient not taking: Reported on 10/16/2020) 30 Tab 0   • buprenorphine-naloxone (SUBOXONE) 8-2 MG SL Tab Place 1 Tab under tongue 2 Times a Day. Pain     • amitriptyline (ELAVIL) 50 MG Tab Take 50  "mg by mouth every evening.       No facility-administered encounter medications on file as of 1/20/2021.      Review of Systems   Constitutional: Negative for chills and fever.   Respiratory: Positive for shortness of breath. Negative for cough, hemoptysis, sputum production and wheezing.    Cardiovascular: Negative for chest pain, palpitations, orthopnea, claudication, leg swelling and PND.   Gastrointestinal: Negative for nausea and vomiting.   Neurological: Negative for dizziness and headaches.   All other systems reviewed and are negative.       Objective:   BP (!) 0/0 (BP Location: Left arm, Patient Position: Sitting, BP Cuff Size: Adult)   Resp 16   Ht 1.778 m (5' 10\")   Wt 67.6 kg (149 lb)   BMI 21.38 kg/m²     Physical Exam   Constitutional: He appears well-developed and well-nourished.   Eyes: EOM are normal.   Neck: Neck supple. No JVD present.   Cardiovascular: Normal rate, regular rhythm and normal heart sounds.   No murmur heard.  Pulmonary/Chest: Effort normal and breath sounds normal.   Abdominal: Soft.   Neurological:   CN II-XII grossly intact   Skin: Skin is warm and dry.   Psychiatric: He has a normal mood and affect. His behavior is normal. Judgment and thought content normal.   Nursing note and vitals reviewed.    EKG dated September 13, 2020: My personal interpretation is sinus bradycardia, low voltage in the limb leads, nonspecific ST changes     Echo dated 9/13/2020:   Normal left ventricular systolic function.  Left ventricular ejection fraction is visually estimated to be 60%.  Mild hypokinesis of the mid anteroseptal wall.  Indeterminate diastolic function.  Normal right ventricular size and systolic function.  Biatrial enlargement  No significant valvular regurgitation or stenosis.   Normal estimated right atrial pressure  Unable to estimate pulmonary artery pressure due to an inadequate   tricuspid regurgitant jet.  Compared to the images of the prior study done 8/22/2019: The mid "   anteroseptal wall motion abnormality is new.      CARDIAC CATHETERIZATION CONCLUSIONS by Jean Judd (09/12/20)  1.  100% thrombotically occluded proximal LAD stent  2.  Mild nonobstructive CAD otherwise  3.  LVEF 30% prior to intervention  4.  Successful IVIU guided PCI culprit LAD (3.0 x 34 mm [place] OSCAR), excellent result  (study result reviewed)     CARDIAC CATHETERIZATION CONCLUSIONS (08/21/19)  1.  Coronary artery disease with occluded proximal and mid left anterior   descending artery.  2.  Successful thrombectomy/percutaneous transluminal coronary angioplasty,  stent placement of the proximal left anterior descending artery with 2.5x20 mm   Synergy drug-eluting stent.  3.  Successful percutaneous transluminal coronary angioplasty,stent placement   of the mid left anterior descending artery with 2.25x28 mm Synergy   drug-eluting stent.  4.  Reduced left ventricular systolic function with ejection fraction of 30%.  5.  Elevated left ventricular end-diastolic pressure.  (study result reviewed)    Assessment:     1. Essential hypertension     2. Ischemic heart disease due to coronary artery obstruction (HCC)     3. H/O ST elevation myocardial infarction     4. Hyperglycemia     5. Nicotine abuse         Medical Decision Making:  Today's Assessment / Status / Plan:   Continue same medications.  [facility] for trying to obtain lisinopril and metoprolol succinate.  He also needs to obtain echocardiogram to reassess ejection fraction.  He is also going to try and obtain [place] as he is currently smoking 2 packs/day and was previously only smoking half pack per day.  He is ultimately working on a goal of complete abstinence.      Follow-up visit in 6 months with Dr Ochoa.    Please note that this dictation was created using voice recognition software.  I have made every reasonable attempt to correct obvious errors, but it is possible there are errors of grammar or possibly content that I did  not discover before finalizing the note.

## 2021-02-23 ENCOUNTER — TELEPHONE (OUTPATIENT)
Dept: CARDIOLOGY | Facility: MEDICAL CENTER | Age: 66
End: 2021-02-23

## 2021-02-23 NOTE — TELEPHONE ENCOUNTER
HK    Pt called stating he can't get the echo [facility] ordered for him until a request is sent to the [facility]. If any questions please call Pt back at [phone].

## 2021-03-03 DIAGNOSIS — Z23 NEED FOR VACCINATION: ICD-10-CM

## 2021-03-15 ENCOUNTER — TELEPHONE (OUTPATIENT)
Dept: CARDIOLOGY | Facility: MEDICAL CENTER | Age: 66
End: 2021-03-15

## 2021-03-15 DIAGNOSIS — I21.3 ST ELEVATION MYOCARDIAL INFARCTION (STEMI), UNSPECIFIED ARTERY (HCC): ICD-10-CM

## 2021-03-15 DIAGNOSIS — I24.0 ISCHEMIC HEART DISEASE DUE TO CORONARY ARTERY OBSTRUCTION (HCC): ICD-10-CM

## 2021-03-15 DIAGNOSIS — I25.9 ISCHEMIC HEART DISEASE DUE TO CORONARY ARTERY OBSTRUCTION (HCC): ICD-10-CM

## 2021-03-15 DIAGNOSIS — I10 ESSENTIAL HYPERTENSION: ICD-10-CM

## 2021-03-15 RX ORDER — SPIRONOLACTONE 25 MG/1
25 TABLET ORAL DAILY
Qty: 90 TABLET | Refills: 3 | Status: SHIPPED | OUTPATIENT
Start: 2021-03-15

## 2021-03-15 NOTE — TELEPHONE ENCOUNTER
DB    Pt called stating he would like to know if he is still supposed to be taking spironolactone and if so he needs a new prescription sent to [facility]. Pt states he has been out of this medication for a week. Please call Pt back at [phone].    Thank you

## 2021-03-15 NOTE — TELEPHONE ENCOUNTER
Called pt. He requests a printed RX to be signed by Summer Joe to take to the [facility].   Summer Joe is out of the office to return on Wed.   Pt. Will  signed RX on Wed. 3-17-21 after 12pm at [address]   RX on Summer's desk.

## 2021-07-22 ENCOUNTER — TELEPHONE (OUTPATIENT)
Dept: CARDIOLOGY | Facility: MEDICAL CENTER | Age: 66
End: 2021-07-22

## 2021-07-22 NOTE — TELEPHONE ENCOUNTER
Called pt to confirm that echocardiogram was completed. Pt stated he completed echo over a month ago at the [facility].   Pt then stated that he was told his heart is pumping at 60% and he is concerned about this. He is also concerned about his low blood pressure readings. He would like RN to give him a call back. He is unsure if his insurance is still covering his office visits and stated he would cancel appt on 7/28/21 if it is not being covered.

## 2021-07-23 NOTE — TELEPHONE ENCOUNTER
Left detailed message with pt at personal  at [phone] notifying that 60% is within a normal range for EF. Also informed that the most recent [facility] authorization we have on file (in media 10/15/2020) shows that auth  2021. Instructed him to see his [facility] doctor to get a new auth. Informed that he can push out his [facility] appt until authorization is obtained. Encouraged him to call back with any questions.

## 2021-07-24 ASSESSMENT — ENCOUNTER SYMPTOMS
COUGH: 0
WHEEZING: 0
SHORTNESS OF BREATH: 0
IRREGULAR HEARTBEAT: 0
DIARRHEA: 0
DYSPNEA ON EXERTION: 0
ABDOMINAL PAIN: 0
WEAKNESS: 0
PALPITATIONS: 0
NEAR-SYNCOPE: 0
PND: 0
FOCAL WEAKNESS: 0
FEVER: 0
VOMITING: 0
DIZZINESS: 0
NIGHT SWEATS: 0
SYNCOPE: 0
NAUSEA: 0
ORTHOPNEA: 0

## 2021-07-24 NOTE — PROGRESS NOTES
"    Cardiology Follow-up Consultation Note    Date of note:    ***    Primary Care Provider: REEMA Carbajal  Referring Provider:  LETY Carbajal.    Patient Name: Tan Moore     YOB: 1955  MRN:              [id number]    Chief Complaint: Follow-up CAD    History of Present Illness: Mr. Tan Moore is a 65 y.o. male whose current medical problems include CAD (STEMI status post PCI to LAD 9/12/2020, in-stent thrombosis, previously stented 8/2019), and essential hypertension who is here for follow-up CAD.    The patient was last seen in my clinic on 10/15/2020 after being hospitalized in September 2020 for STEMI denies post PCI to proximal LAD 9/12/2020 (in-stent thrombosis as this was previously stented 8/2019).  The patient followed up with Summer [facility] on 1/20/2021; the patient was doing well at the time and no changes were made to his medications.  [facility]    He returns today for follow-up ***      Today, the patient reports feeling well.  He has notified me that he has stopped taking all his cardiac medications after [facility] for feeling weak.  Per the patient, he reportedly was given what sounds like 2 units of blood, underwent endoscopy, and was discharged.  We do not have those records, and we are actively retrieving those records.  Per the patient, he did not have any bleeding, hematemesis, or hematochezia prior to going the ER.  Denies any current bleeding as well.  He denies any lightheadedness or syncope/presyncopal episodes prior to the ER visit.  [facility] states \"patient to restart [facility]ic[facility]relor.\"  We discussed the importance of him being on both aspirin and ticagrelor as he recently had STEMI with in-stent thrombosis.  The patient understands and will resume all his cardiac medications.    He denies any chest pain or shortness of breath.  Denies any orthopnea, PND, or " leg swelling.    Cardiovascular Risk Factors:  1. Smoking status: Coronary smoker  2. Type II Diabetes Mellitus:    Lab Results   Component Value Date/Time    HBA1C 5.9 (H) 08/23/2019 04:00 AM     3. Hypertension:   4. Dyslipidemia:    Cholesterol,Tot   Date Value Ref Range Status   08/22/2019 147 100 - 199 mg/dL Final     LDL   Date Value Ref Range Status   08/22/2019 91 <100 mg/dL Final     HDL   Date Value Ref Range Status   08/22/2019 34 (A) >=40 mg/dL Final     Triglycerides   Date Value Ref Range Status   08/22/2019 111 0 - 149 mg/dL Final     5. Family history of early Coronary Artery Disease in a first degree relative (Male less than 55 years of age; Female less than 65 years of age): None  6.  Obesity and/or Metabolic Syndrome: BMI 21.0  7. Sedentary lifestyle: Yes    Review of Systems   Constitutional: Negative for fever, malaise/fatigue and night sweats.   Cardiovascular: Negative for chest pain, dyspnea on exertion, irregular heartbeat, leg swelling, near-syncope, orthopnea, palpitations, paroxysmal nocturnal dyspnea and syncope.   Respiratory: Negative for cough, shortness of breath and wheezing.    Gastrointestinal: Negative for abdominal pain, diarrhea, nausea and vomiting.   Neurological: Negative for dizziness, focal weakness and weakness.         All other systems reviewed and discussed using a comprehensive questionnaire and are negative.     Current Outpatient Medications   Medication Sig Dispense Refill   • spironolactone (ALDACTONE) 25 MG Tab Take 1 tablet by mouth every day. 90 tablet 3   • Ferrous Sulfate (IRON) 325 (65 Fe) MG Tab Take  by mouth.     • Cyanocobalamin (VITAMIN B-12 ER PO) Take  by mouth.     • traMADol (ULTRAM) 50 MG Tab Take 50 mg by mouth every four hours as needed.     • metoprolol SR (TOPROL XL) 50 MG TABLET SR 24 HR Take 0.5 Tabs by mouth every day. 30 Tab 11   • lisinopril (PRINIVIL) 5 MG Tab Take 1 Tab by mouth every 12 hours. 30 Tab 0   • ticagrelor (BRILINTA) 90 MG  Tab tablet Take 1 Tab by mouth 2 Times a Day. 60 Tab 1   • aspirin EC 81 MG EC tablet Take 1 Tab by mouth every day. 30 Tab 0   • atorvastatin (LIPITOR) 80 MG tablet Take 1 Tab by mouth every evening. 30 Tab 0     No current facility-administered medications for this visit.         No Known Allergies      No family history on file.      Social History     Socioeconomic History   • Marital status: Single     Spouse name: Not on file   • Number of children: Not on file   • Years of education: Not on file   • Highest education level: Not on file   Occupational History   • Not on file   Tobacco Use   • Smoking status: Current Every Day Smoker     Packs/day: 1.00     Years: 46.00     Pack years: 46.00     Types: Cigarettes   • Smokeless tobacco: Never Used   Vaping Use   • Vaping Use: Never used   Substance and Sexual Activity   • Alcohol use: Never   • Drug use: Never   • Sexual activity: Not on file   Other Topics Concern   • Not on file   Social History Narrative   • Not on file     Social Determinants of Health     Financial Resource Strain:    • Difficulty of Paying Living Expenses:    Food Insecurity:    • Worried About Running Out of Food in the Last Year:    • Ran Out of Food in the Last Year:    Transportation Needs:    • Lack of Transportation (Medical):    • Lack of Transportation (Non-Medical):    Physical Activity:    • Days of Exercise per Week:    • Minutes of Exercise per Session:    Stress:    • Feeling of Stress :    Social Connections:    • Frequency of Communication with Friends and Family:    • Frequency of Social Gatherings with Friends and Family:    • Attends Islam Services:    • Active Member of Clubs or Organizations:    • Attends Club or Organization Meetings:    • Marital Status:    Intimate Partner Violence:    • Fear of Current or Ex-Partner:    • Emotionally Abused:    • Physically Abused:    • Sexually Abused:          Physical Exam:  Ambulatory Vitals  There were no vitals taken for  this visit.   Oxygen Therapy:     BP Readings from Last 4 Encounters:   01/20/21 140/88   10/16/20 124/68   09/14/20 114/73   08/23/19 104/65       Weight/BMI: There is no height or weight on file to calculate BMI.  Wt Readings from Last 4 Encounters:   01/20/21 67.6 kg (149 lb)   10/16/20 66.5 kg (146 lb 9.6 oz)   09/12/20 61 kg (134 lb 7.7 oz)   08/22/19 72 kg (158 lb 11.7 oz)         General: Well appearing and in no apparent distress  Eyes: nl conjunctiva, no icteric sclera  ENT: wearing a mask, normal external appearance of ears  Neck: no visible JVP,  no carotid bruits  Lungs: normal respiratory effort, CTAB  Heart: RRR, no murmurs, no rubs or gallops,  no edema bilateral lower extremities. No LV/RV heave on cardiac palpatation. + bilateral radial pulses.  + bilateral dp pulses.   Abdomen: soft, non tender, non distended, no masses, normal bowel sounds.  No HSM.  Extremities/MSK: no clubbing, no cyanosis  Neurological: No focal sensory deficits  Psychiatric: Appropriate affect, A/O x 3, intact judgement and insight  Skin: Warm extremities      Lab Data Review:  Lab Results   Component Value Date/Time    CHOLSTRLTOT 147 08/22/2019 04:42 AM    LDL 91 08/22/2019 04:42 AM    HDL 34 (A) 08/22/2019 04:42 AM    TRIGLYCERIDE 111 08/22/2019 04:42 AM       Lab Results   Component Value Date/Time    SODIUM 131 (L) 09/13/2020 04:20 AM    POTASSIUM 4.4 09/13/2020 04:20 AM    CHLORIDE 103 09/13/2020 04:20 AM    CO2 20 09/13/2020 04:20 AM    GLUCOSE 80 09/13/2020 04:20 AM    BUN 15 09/13/2020 04:20 AM    CREATININE 1.02 09/13/2020 04:20 AM     Lab Results   Component Value Date/Time    ALKPHOSPHAT 72 09/12/2020 10:10 AM    ASTSGOT 9 (L) 09/12/2020 10:10 AM    ALTSGPT 15 09/12/2020 10:10 AM    TBILIRUBIN <0.2 09/12/2020 10:10 AM      Lab Results   Component Value Date/Time    WBC 7.7 09/13/2020 04:20 AM     Lab Results   Component Value Date/Time    HBA1C 5.9 (H) 08/23/2019 04:00 AM         Cardiac Imaging and Procedures  Review:    EKG dated September 13, 2020: My personal interpretation is sinus bradycardia, low voltage in the limb leads, nonspecific ST changes    Echo dated 9/13/2020:   Normal left ventricular systolic function.  Left ventricular ejection fraction is visually estimated to be 60%.  Mild hypokinesis of the mid anteroseptal wall.  Indeterminate diastolic function.  Normal right ventricular size and systolic function.  Biatrial enlargement  No significant valvular regurgitation or stenosis.   Normal estimated right atrial pressure  Unable to estimate pulmonary artery pressure due to an inadequate   tricuspid regurgitant jet.  Compared to the images of the prior study done 8/22/2019: The mid   anteroseptal wall motion abnormality is new.     CARDIAC CATHETERIZATION CONCLUSIONS by Jean Judd (09/12/20)  1.  100% thrombotically occluded proximal LAD stent  2.  Mild nonobstructive CAD otherwise  3.  LVEF 30% prior to intervention  4.  Successful IVIU guided PCI culprit LAD (3.0 x 34 mm [place] OSCAR), excellent result  (study result reviewed)     CARDIAC CATHETERIZATION CONCLUSIONS (08/21/19)  1.  Coronary artery disease with occluded proximal and mid left anterior   descending artery.  2.  Successful thrombectomy/percutaneous transluminal coronary angioplasty,  stent placement of the proximal left anterior descending artery with 2.5x20 mm   Synergy drug-eluting stent.  3.  Successful percutaneous transluminal coronary angioplasty,stent placement   of the mid left anterior descending artery with 2.25x28 mm Synergy   drug-eluting stent.  4.  Reduced left ventricular systolic function with ejection fraction of 30%.  5.  Elevated left ventricular end-diastolic pressure.  (study result reviewed)        Assessment & Plan     No diagnosis found.      Shared Medical Decision Making:    Ischemic heart disease due to cardioprotection  CAD with STEMI status post PCI to LAD September 2020  -Continue aspirin 81 mg daily and  ticagrelor 90 mg twice daily  -Continue high intensity statin (atorvastatin 80 mg daily)  -Continue metoprolol 25 mg daily and lisinopril 5 mg daily  -Continue spironolactone 25mg daily  -Repeat echocardiogram prior to next visit to assess LVEF and wall motion abnormality  -[facility].  Patient is given ER warning signs for chest pain as well as bleeding.  -I will review records and if he indeed have significant bleeding, we can consider changing ticagrelor to clopidogrel.  From clinical history today, it does not appear that patient has significant bleeding.  It is imperative that the patient stays on dual antiplatelet therapy for 1 year to prevent another STEMI.  The patient reports understanding and will resume his cardiac medications.    Essential hypertension  Pressure within normal limits  -Continue metoprolol 25 mg daily, lisinopril 5 mg daily, spironolactone 25 mg daily    All of the patient's excellent questions were answered to the best of my knowledge and to his satisfaction.  It was a pleasure seeing Mr. Tan Moore in my clinic today. No follow-ups on file. Patient is aware to call the cardiology clinic with any questions or concerns.      Burt Ochoa MD  [facility]  [facility], [address]  [address] [address]  Phone: [phone]  Fax: [phone]

## 2021-07-27 NOTE — TELEPHONE ENCOUNTER
Results of echocardiogram requested from the [facility] at fax # [phone]. Fax confirmation received. Records pending.

## 2021-07-28 ENCOUNTER — APPOINTMENT (OUTPATIENT)
Dept: CARDIOLOGY | Facility: MEDICAL CENTER | Age: 66
End: 2021-07-28
Payer: COMMERCIAL

## 2023-05-20 ENCOUNTER — HOSPITAL ENCOUNTER (INPATIENT)
Facility: MEDICAL CENTER | Age: 68
LOS: 1 days | DRG: 378 | End: 2023-05-21
Attending: INTERNAL MEDICINE | Admitting: INTERNAL MEDICINE
Payer: COMMERCIAL

## 2023-05-20 DIAGNOSIS — K92.1 GASTROINTESTINAL HEMORRHAGE WITH MELENA: ICD-10-CM

## 2023-05-20 LAB
ABO + RH BLD: NORMAL
ABO GROUP BLD: ABNORMAL
BLD GP AB SCN SERPL QL: ABNORMAL
HGB BLD-MCNC: 11.8 G/DL (ref 14–18)
RH BLD: ABNORMAL

## 2023-05-20 PROCEDURE — 700102 HCHG RX REV CODE 250 W/ 637 OVERRIDE(OP): Performed by: INTERNAL MEDICINE

## 2023-05-20 PROCEDURE — 36415 COLL VENOUS BLD VENIPUNCTURE: CPT

## 2023-05-20 PROCEDURE — 86901 BLOOD TYPING SEROLOGIC RH(D): CPT

## 2023-05-20 PROCEDURE — A9270 NON-COVERED ITEM OR SERVICE: HCPCS | Performed by: INTERNAL MEDICINE

## 2023-05-20 PROCEDURE — 99223 1ST HOSP IP/OBS HIGH 75: CPT | Mod: AI,25 | Performed by: INTERNAL MEDICINE

## 2023-05-20 PROCEDURE — 85018 HEMOGLOBIN: CPT

## 2023-05-20 PROCEDURE — 770020 HCHG ROOM/CARE - TELE (206)

## 2023-05-20 PROCEDURE — 700105 HCHG RX REV CODE 258: Performed by: INTERNAL MEDICINE

## 2023-05-20 PROCEDURE — 99406 BEHAV CHNG SMOKING 3-10 MIN: CPT | Performed by: INTERNAL MEDICINE

## 2023-05-20 PROCEDURE — 86850 RBC ANTIBODY SCREEN: CPT

## 2023-05-20 PROCEDURE — 86900 BLOOD TYPING SEROLOGIC ABO: CPT

## 2023-05-20 RX ORDER — BISACODYL 10 MG
10 SUPPOSITORY, RECTAL RECTAL
Status: DISCONTINUED | OUTPATIENT
Start: 2023-05-20 | End: 2023-05-21 | Stop reason: HOSPADM

## 2023-05-20 RX ORDER — ONDANSETRON 2 MG/ML
4 INJECTION INTRAMUSCULAR; INTRAVENOUS EVERY 4 HOURS PRN
Status: DISCONTINUED | OUTPATIENT
Start: 2023-05-20 | End: 2023-05-21 | Stop reason: HOSPADM

## 2023-05-20 RX ORDER — POLYETHYLENE GLYCOL 3350 17 G/17G
1 POWDER, FOR SOLUTION ORAL
Status: DISCONTINUED | OUTPATIENT
Start: 2023-05-20 | End: 2023-05-21 | Stop reason: HOSPADM

## 2023-05-20 RX ORDER — OXYCODONE HYDROCHLORIDE 5 MG/1
2.5 TABLET ORAL
Status: DISCONTINUED | OUTPATIENT
Start: 2023-05-20 | End: 2023-05-21 | Stop reason: HOSPADM

## 2023-05-20 RX ORDER — METOPROLOL SUCCINATE 25 MG/1
25 TABLET, EXTENDED RELEASE ORAL DAILY
Status: DISCONTINUED | OUTPATIENT
Start: 2023-05-21 | End: 2023-05-21 | Stop reason: HOSPADM

## 2023-05-20 RX ORDER — TRAMADOL HYDROCHLORIDE 50 MG/1
50 TABLET ORAL EVERY 4 HOURS PRN
Status: DISCONTINUED | OUTPATIENT
Start: 2023-05-20 | End: 2023-05-21 | Stop reason: HOSPADM

## 2023-05-20 RX ORDER — ACETAMINOPHEN 325 MG/1
650 TABLET ORAL EVERY 6 HOURS PRN
Status: DISCONTINUED | OUTPATIENT
Start: 2023-05-20 | End: 2023-05-21 | Stop reason: HOSPADM

## 2023-05-20 RX ORDER — SODIUM CHLORIDE 9 MG/ML
INJECTION, SOLUTION INTRAVENOUS CONTINUOUS
Status: DISCONTINUED | OUTPATIENT
Start: 2023-05-20 | End: 2023-05-21 | Stop reason: HOSPADM

## 2023-05-20 RX ORDER — LABETALOL HYDROCHLORIDE 5 MG/ML
10 INJECTION, SOLUTION INTRAVENOUS EVERY 4 HOURS PRN
Status: DISCONTINUED | OUTPATIENT
Start: 2023-05-20 | End: 2023-05-21 | Stop reason: HOSPADM

## 2023-05-20 RX ORDER — FERROUS SULFATE 325(65) MG
325 TABLET ORAL
Status: DISCONTINUED | OUTPATIENT
Start: 2023-05-21 | End: 2023-05-21 | Stop reason: HOSPADM

## 2023-05-20 RX ORDER — ONDANSETRON 4 MG/1
4 TABLET, ORALLY DISINTEGRATING ORAL EVERY 4 HOURS PRN
Status: DISCONTINUED | OUTPATIENT
Start: 2023-05-20 | End: 2023-05-21 | Stop reason: HOSPADM

## 2023-05-20 RX ORDER — ATORVASTATIN CALCIUM 80 MG/1
80 TABLET, FILM COATED ORAL EVERY EVENING
Status: DISCONTINUED | OUTPATIENT
Start: 2023-05-20 | End: 2023-05-21 | Stop reason: HOSPADM

## 2023-05-20 RX ORDER — PANTOPRAZOLE SODIUM 40 MG/10ML
40 INJECTION, POWDER, LYOPHILIZED, FOR SOLUTION INTRAVENOUS 2 TIMES DAILY
Status: DISCONTINUED | OUTPATIENT
Start: 2023-05-20 | End: 2023-05-21 | Stop reason: HOSPADM

## 2023-05-20 RX ORDER — AMOXICILLIN 250 MG
2 CAPSULE ORAL 2 TIMES DAILY
Status: DISCONTINUED | OUTPATIENT
Start: 2023-05-20 | End: 2023-05-21 | Stop reason: HOSPADM

## 2023-05-20 RX ORDER — CHOLECALCIFEROL (VITAMIN D3) 125 MCG
1000 CAPSULE ORAL DAILY
Status: DISCONTINUED | OUTPATIENT
Start: 2023-05-21 | End: 2023-05-21 | Stop reason: HOSPADM

## 2023-05-20 RX ORDER — MORPHINE SULFATE 4 MG/ML
2 INJECTION INTRAVENOUS
Status: DISCONTINUED | OUTPATIENT
Start: 2023-05-20 | End: 2023-05-21 | Stop reason: HOSPADM

## 2023-05-20 RX ORDER — OXYCODONE HYDROCHLORIDE 5 MG/1
5 TABLET ORAL
Status: DISCONTINUED | OUTPATIENT
Start: 2023-05-20 | End: 2023-05-21 | Stop reason: HOSPADM

## 2023-05-20 RX ADMIN — TRAMADOL HYDROCHLORIDE 50 MG: 50 TABLET, COATED ORAL at 23:44

## 2023-05-20 RX ADMIN — SODIUM CHLORIDE: 9 INJECTION, SOLUTION INTRAVENOUS at 20:39

## 2023-05-20 RX ADMIN — ATORVASTATIN CALCIUM 80 MG: 80 TABLET, FILM COATED ORAL at 20:39

## 2023-05-20 ASSESSMENT — COGNITIVE AND FUNCTIONAL STATUS - GENERAL
DAILY ACTIVITIY SCORE: 24
MOBILITY SCORE: 24
SUGGESTED CMS G CODE MODIFIER MOBILITY: CH
SUGGESTED CMS G CODE MODIFIER DAILY ACTIVITY: CH

## 2023-05-20 ASSESSMENT — LIFESTYLE VARIABLES
TOTAL SCORE: 0
TOTAL SCORE: 0
CONSUMPTION TOTAL: NEGATIVE
EVER HAD A DRINK FIRST THING IN THE MORNING TO STEADY YOUR NERVES TO GET RID OF A HANGOVER: NO
HAVE PEOPLE ANNOYED YOU BY CRITICIZING YOUR DRINKING: NO
TOTAL SCORE: 0
HAVE YOU EVER FELT YOU SHOULD CUT DOWN ON YOUR DRINKING: NO
EVER FELT BAD OR GUILTY ABOUT YOUR DRINKING: NO
DOES PATIENT WANT TO STOP DRINKING: NO
HOW MANY TIMES IN THE PAST YEAR HAVE YOU HAD 5 OR MORE DRINKS IN A DAY: 0
ALCOHOL_USE: NO
ON A TYPICAL DAY WHEN YOU DRINK ALCOHOL HOW MANY DRINKS DO YOU HAVE: 0
AVERAGE NUMBER OF DAYS PER WEEK YOU HAVE A DRINK CONTAINING ALCOHOL: 0

## 2023-05-20 ASSESSMENT — PATIENT HEALTH QUESTIONNAIRE - PHQ9
1. LITTLE INTEREST OR PLEASURE IN DOING THINGS: NOT AT ALL
SUM OF ALL RESPONSES TO PHQ9 QUESTIONS 1 AND 2: 0
2. FEELING DOWN, DEPRESSED, IRRITABLE, OR HOPELESS: NOT AT ALL

## 2023-05-20 NOTE — PROGRESS NOTES
"[facility]OSPITAL[facility] TRIAGE OFFICER DIRECT ADMISSION REPORT  Transferring facility: [facility]  Transferring physician:    Chief complaint: Hypotension  Pertinent history & patient course: 68-year-old male with past medical history of CAD presented to [facility] for lightheadedness.  He found to have decrease in his hemoglobin.  His hemoglobin was 13.2 and now it is 11.6.  He found to have Hemoccult positive.  He has history of CAD but his EKG and troponin came back normal.  He is on aspirin 81 mg.  He does not take DOAC.  Currently patient is hemodynamically stable for transfer.  [facility] does not have GI services over the weekend.  Patient is currently hemodynamically stable and is blood pressure has improved.  Plan is to admit him to telemetry with history of CAD and hypertension.  I did not consult GI prior to arrival.  Pertinent imaging & lab results: Hemoglobin is trending down  Code Status: Full code per transferring provider, I personally verified with the transferring provider patient's code status and the transferring provider has confirmed this with the patient.  Further work up or recommendations per triage officer prior to transfer: None  Consultants called prior to transfer and pertinent input from consultants: None  Patient accepted for transfer: Yes  Consultants to be called upon arrival: GI may be tomorrow morning if he is stable.  Admission status: Inpatient.   Floor requested: Telemetry  If ICU transfer, name of intensivist case discussed with and pertinent input from critical care: None    Please inform the \"Triage Coord.-RTOC\" through [employer] upon arrival of the patient to [facility] for assignment of a hospitalist to perform admission. Reach out to the assigned hospitalist (assigned by RTOC) for any questions or concerns regarding the care of this patient.           "

## 2023-05-21 VITALS
BODY MASS INDEX: 22.92 KG/M2 | SYSTOLIC BLOOD PRESSURE: 136 MMHG | HEIGHT: 69 IN | DIASTOLIC BLOOD PRESSURE: 67 MMHG | OXYGEN SATURATION: 99 % | WEIGHT: 154.76 LBS | TEMPERATURE: 98.6 F | HEART RATE: 50 BPM | RESPIRATION RATE: 16 BRPM

## 2023-05-21 PROBLEM — R07.9 CHEST PAIN: Status: ACTIVE | Noted: 2023-05-21

## 2023-05-21 PROBLEM — R00.1 BRADYCARDIA: Status: ACTIVE | Noted: 2023-05-21

## 2023-05-21 PROBLEM — E87.1 HYPONATREMIA: Status: ACTIVE | Noted: 2023-05-21

## 2023-05-21 PROBLEM — K92.2 GI BLEED: Status: ACTIVE | Noted: 2023-05-21

## 2023-05-21 LAB
ALBUMIN SERPL BCP-MCNC: 3.4 G/DL (ref 3.2–4.9)
ALBUMIN/GLOB SERPL: 1.4 G/DL
ALP SERPL-CCNC: 103 U/L (ref 30–99)
ALT SERPL-CCNC: 20 U/L (ref 2–50)
ANION GAP SERPL CALC-SCNC: 7 MMOL/L (ref 7–16)
AST SERPL-CCNC: 19 U/L (ref 12–45)
BASOPHILS # BLD AUTO: 0.6 % (ref 0–1.8)
BASOPHILS # BLD: 0.05 K/UL (ref 0–0.12)
BILIRUB SERPL-MCNC: 0.2 MG/DL (ref 0.1–1.5)
BUN SERPL-MCNC: 17 MG/DL (ref 8–22)
CALCIUM ALBUM COR SERPL-MCNC: 8.8 MG/DL (ref 8.5–10.5)
CALCIUM SERPL-MCNC: 8.3 MG/DL (ref 8.5–10.5)
CHLORIDE SERPL-SCNC: 100 MMOL/L (ref 96–112)
CO2 SERPL-SCNC: 26 MMOL/L (ref 20–33)
CREAT SERPL-MCNC: 1.38 MG/DL (ref 0.5–1.4)
EOSINOPHIL # BLD AUTO: 0.21 K/UL (ref 0–0.51)
EOSINOPHIL NFR BLD: 2.7 % (ref 0–6.9)
ERYTHROCYTE [DISTWIDTH] IN BLOOD BY AUTOMATED COUNT: 47 FL (ref 35.9–50)
GFR SERPLBLD CREATININE-BSD FMLA CKD-EPI: 56 ML/MIN/1.73 M 2
GLOBULIN SER CALC-MCNC: 2.5 G/DL (ref 1.9–3.5)
GLUCOSE SERPL-MCNC: 85 MG/DL (ref 65–99)
HCT VFR BLD AUTO: 34.5 % (ref 42–52)
HGB BLD-MCNC: 11.3 G/DL (ref 14–18)
HGB BLD-MCNC: 11.4 G/DL (ref 14–18)
IMM GRANULOCYTES # BLD AUTO: 0.03 K/UL (ref 0–0.11)
IMM GRANULOCYTES NFR BLD AUTO: 0.4 % (ref 0–0.9)
LIPASE SERPL-CCNC: 12 U/L (ref 11–82)
LYMPHOCYTES # BLD AUTO: 2.15 K/UL (ref 1–4.8)
LYMPHOCYTES NFR BLD: 27.6 % (ref 22–41)
MCH RBC QN AUTO: 31.4 PG (ref 27–33)
MCHC RBC AUTO-ENTMCNC: 32.8 G/DL (ref 33.7–35.3)
MCV RBC AUTO: 95.8 FL (ref 81.4–97.8)
MONOCYTES # BLD AUTO: 0.89 K/UL (ref 0–0.85)
MONOCYTES NFR BLD AUTO: 11.4 % (ref 0–13.4)
NEUTROPHILS # BLD AUTO: 4.47 K/UL (ref 1.82–7.42)
NEUTROPHILS NFR BLD: 57.3 % (ref 44–72)
NRBC # BLD AUTO: 0 K/UL
NRBC BLD-RTO: 0 /100 WBC
PLATELET # BLD AUTO: 251 K/UL (ref 164–446)
PMV BLD AUTO: 9.3 FL (ref 9–12.9)
POTASSIUM SERPL-SCNC: 4.7 MMOL/L (ref 3.6–5.5)
PROT SERPL-MCNC: 5.9 G/DL (ref 6–8.2)
RBC # BLD AUTO: 3.6 M/UL (ref 4.7–6.1)
SODIUM SERPL-SCNC: 133 MMOL/L (ref 135–145)
TROPONIN T SERPL-MCNC: 11 NG/L (ref 6–19)
WBC # BLD AUTO: 7.8 K/UL (ref 4.8–10.8)

## 2023-05-21 PROCEDURE — 85018 HEMOGLOBIN: CPT

## 2023-05-21 PROCEDURE — C9113 INJ PANTOPRAZOLE SODIUM, VIA: HCPCS | Performed by: INTERNAL MEDICINE

## 2023-05-21 PROCEDURE — 700102 HCHG RX REV CODE 250 W/ 637 OVERRIDE(OP): Performed by: INTERNAL MEDICINE

## 2023-05-21 PROCEDURE — 700111 HCHG RX REV CODE 636 W/ 250 OVERRIDE (IP): Performed by: INTERNAL MEDICINE

## 2023-05-21 PROCEDURE — A9270 NON-COVERED ITEM OR SERVICE: HCPCS

## 2023-05-21 PROCEDURE — 84484 ASSAY OF TROPONIN QUANT: CPT

## 2023-05-21 PROCEDURE — 99239 HOSP IP/OBS DSCHRG MGMT >30: CPT | Performed by: INTERNAL MEDICINE

## 2023-05-21 PROCEDURE — 700102 HCHG RX REV CODE 250 W/ 637 OVERRIDE(OP)

## 2023-05-21 PROCEDURE — 700105 HCHG RX REV CODE 258: Performed by: INTERNAL MEDICINE

## 2023-05-21 PROCEDURE — 85025 COMPLETE CBC W/AUTO DIFF WBC: CPT

## 2023-05-21 PROCEDURE — 80053 COMPREHEN METABOLIC PANEL: CPT

## 2023-05-21 PROCEDURE — 36415 COLL VENOUS BLD VENIPUNCTURE: CPT

## 2023-05-21 PROCEDURE — A9270 NON-COVERED ITEM OR SERVICE: HCPCS | Performed by: INTERNAL MEDICINE

## 2023-05-21 PROCEDURE — 83690 ASSAY OF LIPASE: CPT

## 2023-05-21 RX ORDER — OMEPRAZOLE 40 MG/1
40 CAPSULE, DELAYED RELEASE ORAL DAILY
Qty: 30 CAPSULE | Refills: 1 | Status: SHIPPED | OUTPATIENT
Start: 2023-05-21

## 2023-05-21 RX ORDER — OMEPRAZOLE 40 MG/1
40 CAPSULE, DELAYED RELEASE ORAL DAILY
Qty: 30 CAPSULE | Refills: 1 | Status: SHIPPED | OUTPATIENT
Start: 2023-05-21 | End: 2023-05-21 | Stop reason: SDUPTHER

## 2023-05-21 RX ADMIN — FERROUS SULFATE TAB 325 MG (65 MG ELEMENTAL FE) 325 MG: 325 (65 FE) TAB at 10:43

## 2023-05-21 RX ADMIN — SODIUM CHLORIDE: 9 INJECTION, SOLUTION INTRAVENOUS at 05:38

## 2023-05-21 RX ADMIN — PANTOPRAZOLE SODIUM 40 MG: 40 INJECTION, POWDER, FOR SOLUTION INTRAVENOUS at 05:38

## 2023-05-21 RX ADMIN — GABAPENTIN 700 MG: 300 CAPSULE ORAL at 05:37

## 2023-05-21 RX ADMIN — CYANOCOBALAMIN TAB 500 MCG 1000 MCG: 500 TAB at 05:37

## 2023-05-21 RX ADMIN — TRAMADOL HYDROCHLORIDE 50 MG: 50 TABLET, COATED ORAL at 10:50

## 2023-05-21 RX ADMIN — TRAMADOL HYDROCHLORIDE 50 MG: 50 TABLET, COATED ORAL at 05:37

## 2023-05-21 ASSESSMENT — ENCOUNTER SYMPTOMS
ORTHOPNEA: 0
VOMITING: 0
SPUTUM PRODUCTION: 0
HEARTBURN: 0
FEVER: 0
NECK PAIN: 0
PHOTOPHOBIA: 0
WEIGHT LOSS: 0
BLURRED VISION: 0
COUGH: 0
HALLUCINATIONS: 0
NAUSEA: 1
SPEECH CHANGE: 0
HEADACHES: 0
CHILLS: 0
TREMORS: 0
NERVOUS/ANXIOUS: 0
ABDOMINAL PAIN: 1
BACK PAIN: 0
FLANK PAIN: 0
PALPITATIONS: 0
FOCAL WEAKNESS: 0
POLYDIPSIA: 0
DOUBLE VISION: 0
BRUISES/BLEEDS EASILY: 0
HEMOPTYSIS: 0

## 2023-05-21 ASSESSMENT — LIFESTYLE VARIABLES: SUBSTANCE_ABUSE: 0

## 2023-05-21 NOTE — PROGRESS NOTES
Dr. Ivy made aware of patients resting hr of 46. Patient denies symptoms. Monitor Room aware of drs. Response.

## 2023-05-21 NOTE — HOSPITAL COURSE
Tan Moore is a 68 y.o. male with past medical history of CAD, MIx3, peptic ulcer disease, iron deficiency, proctitis, rectal bleed, diabetes type 2, BPH, hypertension, anxiety and depression, who presented 5/20/2023 as a direct admit from [facility].  Patient reported epigastric pain and melena/hematochezia and was transferred to [facility] for GI services if needed.      EKG: Sinus rhythm, heart rate 55, poor R wave progression, no ST elevation.  Fecal occult stool was positive.  Lactic acid 0.9.  CBC showed WBC 11.13, hemoglobin 11.6, platelets 273.                 Troponin is not elevated.  INR 1.1.  aPTT 34.  CMP: Sodium 130, creatinine 1.6, BUN 19, otherwise unremarkable.Chest x-ray: No acute abnormalities.      [facility] 13  one  month ago and apparently it dropped to11.6 by today.  Serial hemoglobins were obtained and patients Hb remained stable at 11. Patient had no further melena/hematochezia while hospitalized and improved clinically. Patient will be discharged on PPI and is agreeable to discharge. Suspect a self resolved diverticular bleed. Patient was determined satisfactory for discharge with appropriate follow up.

## 2023-05-21 NOTE — PROGRESS NOTES
4 Eyes Skin Assessment Completed by ESTER pena and ESTER wilson.    Head WDL  Ears WDL  Nose WDL  Mouth WDL  Neck WDL  Breast/Chest WDL  Shoulder Blades WDL  Spine WDL  (R) Arm/Elbow/Hand WDL  (L) Arm/Elbow/Hand WDL  Abdomen WDL  Groin WDL  Scrotum/Coccyx/Buttocks WDL  (R) Leg WDL  (L) Leg WDL  (R) Heel/Foot/Toe WDL  (L) Heel/Foot/Toe WDL          Devices In Places ECG      Interventions In Place N/A    Possible Skin Injury No    Pictures Uploaded Into Epic N/A  Wound Consult Placed N/A  RN Wound Prevention Protocol Ordered No

## 2023-05-21 NOTE — ASSESSMENT & PLAN NOTE
Known ischemic heart disease, several MIs, reportedly had transient episode of chest pain  [facility]  with negative troponin and EKGNo concern for ACS at this time  Repeat troponin/EKG as needed

## 2023-05-21 NOTE — H&P
Hospital Medicine History & Physical Note    Date of Service  5/20/2023    Primary Care Physician  REEMA Carbajal        Code Status  Full Code    Chief Complaint  Black stools, lightheadedness    History of Presenting Illness  Tan Moore is a 68 y.o. male with past medical history of CAD, MIx3, peptic ulcer disease, iron deficiency, proctitis, rectal bleed, diabetes type 2, BPH, hypertension, anxiety and depression, who presented 5/20/2023 as a direct admit from [facility] where he presented earlier today with complaints of left-sided chest pain that started 1 hour prior to presentation, located in the left costochondral angle, without associated shortness of breath.  Home blood pressure reportedly was 77/49 and then 81/49 and patient stated that he had 3 dark bowel movements today without nausea or vomiting.  He takes baby aspirin.    Upon questioning patient reports pain in the epigastric area and left upper quadrant on and off in the last week.  She denies chest pain to me.    EKG: Sinus rhythm, heart rate 55, poor R wave progression, no ST elevation.  Fecal occult stool was positive.  Lactic acid 0.9.  CBC showed WBC 11.13, hemoglobin 11.6, platelets 273.                 Troponin is not elevated.  INR 1.1.  aPTT 34.  CMP: Sodium 130, creatinine 1.6, BUN 19, otherwise unremarkable.  Chest x-ray: No acute abnormalities.  [facility] hemoglobin was 13  one  month ago and apparently it dropped to 11.6 by today.        Transferring physician spoke to gastroenterology / Dr. Issa, who recommended transfer to [facility].  Apparently [facility] does not have GI service  over weekend    Unclear what treatment patient received at [facility], but his blood pressure is normalized..  I discussed the plan of care with patient.    Review of Systems  Review of Systems   Constitutional:  Negative for chills, fever and weight loss.   HENT:  Negative for ear pain, hearing loss and tinnitus.    Eyes:   Negative for blurred vision, double vision and photophobia.   Respiratory:  Negative for cough, hemoptysis and sputum production.    Cardiovascular:  Negative for chest pain, palpitations and orthopnea.   Gastrointestinal:  Positive for abdominal pain, melena and nausea. Negative for heartburn and vomiting.   Genitourinary:  Negative for dysuria, flank pain, frequency and hematuria.   Musculoskeletal:  Negative for back pain, joint pain and neck pain.   Skin:  Negative for itching and rash.   Neurological:  Negative for tremors, speech change, focal weakness and headaches.   Endo/Heme/Allergies:  Negative for environmental allergies and polydipsia. Does not bruise/bleed easily.   Psychiatric/Behavioral:  Negative for hallucinations and substance abuse. The patient is not nervous/anxious.        Past Medical History   has a past medical history of Anxiety, Arthritis, Bilateral knee pain, Chronic low back pain, Chronic neck pain, Depression, and Hypertension.    Surgical History   has a past surgical history that includes arthroplasty and laminotomy (1988).     Family History  family history is not on file.   Family history reviewed with patient. There is no family history that is pertinent to the chief complaint.     Social History   reports that he has been smoking cigarettes. He has a 46.00 pack-year smoking history. He has never used smokeless tobacco. He reports that he does not drink alcohol and does not use drugs.    Allergies  No Known Allergies    Medications  Prior to Admission Medications   Prescriptions Last Dose Informant Patient Reported? Taking?   Cyanocobalamin (VITAMIN B-12 ER PO)   Yes No   Sig: Take  by mouth.   Ferrous Sulfate (IRON) 325 (65 Fe) MG Tab   Yes No   Sig: Take  by mouth.   aspirin EC 81 MG EC tablet   No No   Sig: Take 1 Tab by mouth every day.   atorvastatin (LIPITOR) 80 MG tablet   No No   Sig: Take 1 Tab by mouth every evening.   lisinopril (PRINIVIL) 5 MG Tab   No No   Sig: Take 1  Tab by mouth every 12 hours.   metoprolol SR (TOPROL XL) 50 MG TABLET SR 24 HR   No No   Sig: Take 0.5 Tabs by mouth every day.   spironolactone (ALDACTONE) 25 MG Tab   No No   Sig: Take 1 tablet by mouth every day.   ticagrelor (BRILINTA) 90 MG Tab tablet   No No   Sig: Take 1 Tab by mouth 2 Times a Day.   traMADol (ULTRAM) 50 MG Tab   Yes No   Sig: Take 50 mg by mouth every four hours as needed.      Facility-Administered Medications: None       Physical Exam  Pulse:  [48] 48  Resp:  [16] 16  BP: (124)/(77) 124/77  SpO2:  [98 %] 98 %  Blood Pressure : 124/77       Pulse: (!) 48   Respiration: 16   Pulse Oximetry: 98 %       Physical Exam  Vitals and nursing note reviewed.   Constitutional:       General: He is not in acute distress.     Appearance: Normal appearance.   HENT:      Head: Normocephalic and atraumatic.      Nose: Nose normal.      Mouth/Throat:      Mouth: Mucous membranes are moist.   Eyes:      Extraocular Movements: Extraocular movements intact.      Pupils: Pupils are equal, round, and reactive to light.   Cardiovascular:      Rate and Rhythm: Normal rate and regular rhythm.   Pulmonary:      Effort: Pulmonary effort is normal.      Breath sounds: Normal breath sounds.   Abdominal:      General: Abdomen is flat. There is no distension.      Tenderness: There is no abdominal tenderness.   Musculoskeletal:         General: No swelling or deformity. Normal range of motion.      Cervical back: Normal range of motion and neck supple.   Skin:     General: Skin is warm and dry.   Neurological:      General: No focal deficit present.      Mental Status: He is alert and oriented to person, place, and time.   Psychiatric:         Mood and Affect: Mood normal.         Behavior: Behavior normal.         Laboratory:          No results for input(s): ALTSGPT, ASTSGOT, ALKPHOSPHAT, TBILIRUBIN, DBILIRUBIN, GAMMAGT, AMYLASE, LIPASE, ALB, PREALBUMIN, GLUCOSE in the last 72 hours.      No results for input(s):  NTPROBNP in the last 72 hours.      No results for input(s): TROPONINT in the last 72 hours.    Imaging:  No orders to display           Assessment/Plan:  Justification for Admission Status  I anticipate this patient will require at least two midnights for appropriate medical management, necessitating inpatient admission because GI bleed    Patient will need a Telemetry bed on MEDICAL service .  The need is secondary to GI bleed.    * Hypotension- (present on admission)  Assessment & Plan  Resolved  Continue IV fluids  Monitor vitals every 4 hours.  Hold spironolactone and lisinopril    Acute blood loss anemia secondary to upper GI bleed  Assessment & Plan  History is most consistent with upper GI bleed, with melena and upper abdominal pain/discomfort   Hemoglobin dropped from 13->11  Plan: Hold antiplatelets  IV Protonix  Monitor H&H, transfuse RBC if hemoglobin drops below 8.0 or patient develops overt bleeding  Depending on clinical course will consider GI consult in a.m.    Bradycardia  Assessment & Plan  Heart rate 45, asymptomatic in the bed  Will hold metoprolol  Monitor heart rate on telemetry      Chest pain  Assessment & Plan  Known ischemic heart disease, several MIs, reportedly had transient episode of chest pain  prior to presentation at [facility]  with negative troponin and EKGNo concern for ACS at this time  Repeat troponin/EKG as needed      Tobacco dependence- (present on admission)  Assessment & Plan  Spent approx 5 mins on Tobacco cessation education . Discussed options of nicotine patch, medical treatment with wellbutrin and chantix. Discussed the benefits of quitting smoking and risks of continued smoking including cardiovascular disease, cancer and COPD.   Code 29469      Essential hypertension- (present on admission)  Assessment & Plan  hold lisinopril metoprololand spironolactone  Monitor blood pressure        VTE prophylaxis: SCDs/TEDs  49 minutes of non face to face time performing  additional research, reviewing old medical records, provided on going management by following up on labs, placing orders, discussing plan of care with other healthcare providers and nursing staff. Start time: 21:01 pm. End time: 21:50 pm    Total time: 127 minutes

## 2023-05-21 NOTE — ASSESSMENT & PLAN NOTE
History is most consistent with upper GI bleed, with melena and upper abdominal pain/discomfort   Hemoglobin dropped from 13->11  Plan: Hold antiplatelets  IV Protonix  Monitor H&H, transfuse RBC if hemoglobin drops below 8.0 or patient develops overt bleeding  Depending on clinical course will consider GI consult in a.m.

## 2023-05-21 NOTE — DISCHARGE SUMMARY
Discharge Summary    CHIEF COMPLAINT ON ADMISSION  No chief complaint on file.      Reason for Admission  GIB     Admission Date  5/20/2023    CODE STATUS  Full Code    HPI & HOSPITAL COURSE  Tan Moore is a 68 y.o. male with past medical history of CAD, MIx3, peptic ulcer disease, iron deficiency, proctitis, rectal bleed, diabetes type 2, BPH, hypertension, anxiety and depression, who presented 5/20/2023 as a direct admit from [facility].  Patient reported epigastric pain and melena/hematochezia and was transferred to [facility] for GI services if needed.      EKG: Sinus rhythm, heart rate 55, poor R wave progression, no ST elevation.  Fecal occult stool was positive.  Lactic acid 0.9.  CBC showed WBC 11.13, hemoglobin 11.6, platelets 273.                 Troponin is not elevated.  INR 1.1.  aPTT 34.  CMP: Sodium 130, creatinine 1.6, BUN 19, otherwise unremarkable.Chest x-ray: No acute abnormalities.      [facility] hemoglobin was 13  one  month ago and apparently it dropped to11.6 by today.  Serial hemoglobins were obtained and patients Hb remained stable at 11. Patient had no further melena/hematochezia while hospitalized and improved clinically. Patient will be discharged on PPI and is agreeable to discharge. Suspect a self resolved diverticular bleed. Patient was determined satisfactory for discharge with appropriate follow up.    Therefore, he is discharged in fair and stable condition to home with close outpatient follow-up.    The patient recovered much more quickly than anticipated on admission.    Discharge Date  05/21/2023    FOLLOW UP ITEMS POST DISCHARGE  Please follow up with PCP in 3-5 days for post hospitalization follow up and medication reconciliation.     DISCHARGE DIAGNOSES  Principal Problem:    Acute blood loss anemia secondary to upper GI bleed (POA: Unknown)  Active Problems:    Essential hypertension (POA: Yes)    Hypotension (POA: Yes)    Arteriosclerosis of coronary  artery (POA: Yes)    Tobacco dependence (POA: Yes)    Chest pain (POA: Unknown)    Bradycardia (POA: Unknown)    Hyponatremia (POA: Unknown)  Resolved Problems:    * No resolved hospital problems. *      FOLLOW UP  No future appointments.  REEMA Carbajal  [address] [facility]  [place] [address]  [phone]    Follow up in 1 week(s)        MEDICATIONS ON DISCHARGE     Medication List        START taking these medications        Instructions   omeprazole 40 MG delayed-release capsule  Commonly known as: PRILOSEC   Take 1 Capsule by mouth every day.  Dose: 40 mg            CONTINUE taking these medications        Instructions   aspirin 81 MG EC tablet   Take 1 Tab by mouth every day.  Dose: 81 mg     atorvastatin 80 MG tablet  Commonly known as: LIPITOR   Take 1 Tab by mouth every evening.  Dose: 80 mg     Gabapentin (Once-Daily) 750 MG Tabs   Take 700 mg by mouth 2 times a day. Indications: Spinal Cord Injury  Dose: 700 mg     Iron 325 (65 Fe) MG Tabs   Take  by mouth.     lisinopril 5 MG Tabs  Commonly known as: PRINIVIL   Take 1 Tab by mouth every 12 hours.  Dose: 5 mg     metoprolol SR 50 MG Tb24  Commonly known as: TOPROL XL   Take 0.5 Tabs by mouth every day.  Dose: 25 mg     spironolactone 25 MG Tabs  Commonly known as: ALDACTONE   Take 1 tablet by mouth every day.  Dose: 25 mg     traMADol 50 MG Tabs  Commonly known as: Ultram   Take 50 mg by mouth every four hours as needed.  Dose: 50 mg     VITAMIN B-12 ER PO   Take  by mouth.            STOP taking these medications      ticagrelor 90 MG Tabs tablet  Commonly known as: Brilinta              Allergies  No Known Allergies    DIET  Orders Placed This Encounter   Procedures    Diet NPO Restrict to: Sips with Medications     Standing Status:   Standing     Number of Occurrences:   8     Order Specific Question:   Diet NPO Restrict to:     Answer:   Sips with Medications [3]       ACTIVITY  As tolerated.  Weight bearing as tolerated      LABORATORY  Lab  Results   Component Value Date    SODIUM 133 (L) 05/21/2023    POTASSIUM 4.7 05/21/2023    CHLORIDE 100 05/21/2023    CO2 26 05/21/2023    GLUCOSE 85 05/21/2023    BUN 17 05/21/2023    CREATININE 1.38 05/21/2023        Lab Results   Component Value Date    WBC 7.8 05/21/2023    HEMOGLOBIN 11.4 (L) 05/21/2023    HEMATOCRIT 34.5 (L) 05/21/2023    PLATELETCT 251 05/21/2023        Total time of the discharge process exceeds 37 minutes.

## 2023-05-21 NOTE — PROGRESS NOTES
Patient arrives to room 811 via [facility] transport. Patient identified by 2 forms of identification. Admission contacted and patient identification band applied. Consent to treat signed and placed on chart. Yovani NORMAN at bedside doing admit profile. VS stable see flowsheet. No signs or symptoms of distress noted. Call light and belongings within reach. Will continue to monitor.

## 2023-05-21 NOTE — ASSESSMENT & PLAN NOTE
Spent approx 5 mins on Tobacco cessation education . Discussed options of nicotine patch, medical treatment with wellbutrin and chantix. Discussed the benefits of quitting smoking and risks of continued smoking including cardiovascular disease, cancer and COPD.   Code 40993